# Patient Record
Sex: MALE | Race: WHITE | Employment: FULL TIME | URBAN - METROPOLITAN AREA
[De-identification: names, ages, dates, MRNs, and addresses within clinical notes are randomized per-mention and may not be internally consistent; named-entity substitution may affect disease eponyms.]

---

## 2018-07-16 ENCOUNTER — ED HISTORICAL/CONVERTED ENCOUNTER (OUTPATIENT)
Dept: OTHER | Age: 33
End: 2018-07-16

## 2021-06-04 ENCOUNTER — HOSPITAL ENCOUNTER (INPATIENT)
Age: 36
LOS: 7 days | Discharge: HOME | DRG: 754 | End: 2021-06-11
Attending: PSYCHIATRY & NEUROLOGY | Admitting: PSYCHIATRY & NEUROLOGY
Payer: COMMERCIAL

## 2021-06-04 DIAGNOSIS — T78.40XA ALLERGIC REACTION, INITIAL ENCOUNTER: ICD-10-CM

## 2021-06-04 DIAGNOSIS — F32.A DEPRESSION, UNSPECIFIED DEPRESSION TYPE: ICD-10-CM

## 2021-06-04 DIAGNOSIS — R45.851 SUICIDAL IDEATION: Primary | ICD-10-CM
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
## 2021-06-04 LAB
ALBUMIN SERPL-MCNC: 3.9 G/DL (ref 3.5–5)
ALBUMIN/GLOB SERPL: 1 {RATIO} (ref 1.1–2.2)
ALP SERPL-CCNC: 75 U/L (ref 45–117)
ALT SERPL-CCNC: 36 U/L (ref 12–78)
AMPHET UR QL SCN: NEGATIVE
ANION GAP SERPL CALC-SCNC: 5 MMOL/L (ref 5–15)
APAP SERPL-MCNC: <10 UG/ML (ref 10–30)
APPEARANCE UR: CLEAR
AST SERPL W P-5'-P-CCNC: 16 U/L (ref 15–37)
BACTERIA URNS QL MICRO: NEGATIVE /HPF
BARBITURATES UR QL SCN: NEGATIVE
BASOPHILS # BLD: 0 K/UL (ref 0–0.1)
BASOPHILS NFR BLD: 1 % (ref 0–1)
BENZODIAZ UR QL: NEGATIVE
BILIRUB SERPL-MCNC: 0.5 MG/DL (ref 0.2–1)
BILIRUB UR QL: NEGATIVE
BUN SERPL-MCNC: 19 MG/DL (ref 6–20)
BUN/CREAT SERPL: 17 (ref 12–20)
CA-I BLD-MCNC: 9.2 MG/DL (ref 8.5–10.1)
CANNABINOIDS UR QL SCN: NEGATIVE
CHLORIDE SERPL-SCNC: 107 MMOL/L (ref 97–108)
CO2 SERPL-SCNC: 29 MMOL/L (ref 21–32)
COCAINE UR QL SCN: NEGATIVE
COLOR UR: NORMAL
CREAT SERPL-MCNC: 1.12 MG/DL (ref 0.7–1.3)
DATE LAST DOSE: ABNORMAL
DATE LAST DOSE: ABNORMAL
DIFFERENTIAL METHOD BLD: NORMAL
DRUG SCRN COMMENT,DRGCM: NORMAL
EOSINOPHIL # BLD: 0.3 K/UL (ref 0–0.4)
EOSINOPHIL NFR BLD: 5 % (ref 0–7)
ERYTHROCYTE [DISTWIDTH] IN BLOOD BY AUTOMATED COUNT: 13.1 % (ref 11.5–14.5)
ETHANOL SERPL-MCNC: <4 MG/DL
GLOBULIN SER CALC-MCNC: 3.8 G/DL (ref 2–4)
GLUCOSE SERPL-MCNC: 101 MG/DL (ref 65–100)
GLUCOSE UR STRIP.AUTO-MCNC: NEGATIVE MG/DL
HCT VFR BLD AUTO: 45 % (ref 36.6–50.3)
HGB BLD-MCNC: 15 G/DL (ref 12.1–17)
HGB UR QL STRIP: NEGATIVE
IMM GRANULOCYTES # BLD AUTO: 0 K/UL (ref 0–0.04)
IMM GRANULOCYTES NFR BLD AUTO: 0 % (ref 0–0.5)
KETONES UR QL STRIP.AUTO: NEGATIVE MG/DL
LEUKOCYTE ESTERASE UR QL STRIP.AUTO: NEGATIVE
LYMPHOCYTES # BLD: 1.6 K/UL (ref 0.8–3.5)
LYMPHOCYTES NFR BLD: 27 % (ref 12–49)
MCH RBC QN AUTO: 31.3 PG (ref 26–34)
MCHC RBC AUTO-ENTMCNC: 33.3 G/DL (ref 30–36.5)
MCV RBC AUTO: 93.9 FL (ref 80–99)
METHADONE UR QL: NEGATIVE
MONOCYTES # BLD: 0.6 K/UL (ref 0–1)
MONOCYTES NFR BLD: 10 % (ref 5–13)
NEUTS SEG # BLD: 3.4 K/UL (ref 1.8–8)
NEUTS SEG NFR BLD: 57 % (ref 32–75)
NITRITE UR QL STRIP.AUTO: NEGATIVE
NRBC # BLD: 0 K/UL (ref 0–0.01)
NRBC BLD-RTO: 0 PER 100 WBC
OPIATES UR QL: NEGATIVE
PCP UR QL: NEGATIVE
PH UR STRIP: 6 [PH] (ref 5–8)
PLATELET # BLD AUTO: 187 K/UL (ref 150–400)
PMV BLD AUTO: 11.8 FL (ref 8.9–12.9)
POTASSIUM SERPL-SCNC: 4.1 MMOL/L (ref 3.5–5.1)
PROT SERPL-MCNC: 7.7 G/DL (ref 6.4–8.2)
PROT UR STRIP-MCNC: NEGATIVE MG/DL
RBC # BLD AUTO: 4.79 M/UL (ref 4.1–5.7)
RBC #/AREA URNS HPF: NORMAL /HPF (ref 0–5)
REPORTED DOSE,DOSE: ABNORMAL UNITS
REPORTED DOSE,DOSE: ABNORMAL UNITS
SALICYLATES SERPL-MCNC: <1.7 MG/DL (ref 2.8–20)
SARS-COV-2, COV2: NORMAL
SARS-COV-2, COV2: NOT DETECTED
SODIUM SERPL-SCNC: 141 MMOL/L (ref 136–145)
SP GR UR REFRACTOMETRY: 1.01 (ref 1–1.03)
UROBILINOGEN UR QL STRIP.AUTO: 0.1 EU/DL (ref 0.1–1)
WBC # BLD AUTO: 6 K/UL (ref 4.1–11.1)
WBC URNS QL MICRO: NORMAL /HPF (ref 0–4)

## 2021-06-04 PROCEDURE — 74011250637 HC RX REV CODE- 250/637: Performed by: NURSE PRACTITIONER

## 2021-06-04 PROCEDURE — 80143 DRUG ASSAY ACETAMINOPHEN: CPT

## 2021-06-04 PROCEDURE — 81001 URINALYSIS AUTO W/SCOPE: CPT

## 2021-06-04 PROCEDURE — 36415 COLL VENOUS BLD VENIPUNCTURE: CPT

## 2021-06-04 PROCEDURE — 99283 EMERGENCY DEPT VISIT LOW MDM: CPT

## 2021-06-04 PROCEDURE — 74011636637 HC RX REV CODE- 636/637: Performed by: NURSE PRACTITIONER

## 2021-06-04 PROCEDURE — 87635 SARS-COV-2 COVID-19 AMP PRB: CPT

## 2021-06-04 PROCEDURE — 85025 COMPLETE CBC W/AUTO DIFF WBC: CPT

## 2021-06-04 PROCEDURE — 65220000003 HC RM SEMIPRIVATE PSYCH

## 2021-06-04 PROCEDURE — 80179 DRUG ASSAY SALICYLATE: CPT

## 2021-06-04 PROCEDURE — 80307 DRUG TEST PRSMV CHEM ANLYZR: CPT

## 2021-06-04 PROCEDURE — 80053 COMPREHEN METABOLIC PANEL: CPT

## 2021-06-04 PROCEDURE — 74011250637 HC RX REV CODE- 250/637: Performed by: PSYCHIATRY & NEUROLOGY

## 2021-06-04 PROCEDURE — 82077 ASSAY SPEC XCP UR&BREATH IA: CPT

## 2021-06-04 RX ORDER — LORATADINE 10 MG/1
10 TABLET ORAL ONCE
Status: COMPLETED | OUTPATIENT
Start: 2021-06-04 | End: 2021-06-04

## 2021-06-04 RX ORDER — TRAZODONE HYDROCHLORIDE 50 MG/1
50 TABLET ORAL
Status: DISCONTINUED | OUTPATIENT
Start: 2021-06-04 | End: 2021-06-11 | Stop reason: HOSPADM

## 2021-06-04 RX ORDER — MAG HYDROX/ALUMINUM HYD/SIMETH 200-200-20
30 SUSPENSION, ORAL (FINAL DOSE FORM) ORAL
Status: DISCONTINUED | OUTPATIENT
Start: 2021-06-04 | End: 2021-06-11 | Stop reason: HOSPADM

## 2021-06-04 RX ORDER — LANOLIN ALCOHOL/MO/W.PET/CERES
3 CREAM (GRAM) TOPICAL
Status: DISCONTINUED | OUTPATIENT
Start: 2021-06-04 | End: 2021-06-11 | Stop reason: HOSPADM

## 2021-06-04 RX ORDER — ACETAMINOPHEN 325 MG/1
650 TABLET ORAL
Status: DISCONTINUED | OUTPATIENT
Start: 2021-06-04 | End: 2021-06-11 | Stop reason: HOSPADM

## 2021-06-04 RX ORDER — PREDNISONE 20 MG/1
60 TABLET ORAL
Status: COMPLETED | OUTPATIENT
Start: 2021-06-04 | End: 2021-06-04

## 2021-06-04 RX ORDER — HYDROXYZINE 50 MG/1
50 TABLET, FILM COATED ORAL
Status: DISCONTINUED | OUTPATIENT
Start: 2021-06-04 | End: 2021-06-11 | Stop reason: HOSPADM

## 2021-06-04 RX ADMIN — MELATONIN TAB 3 MG 3 MG: 3 TAB at 22:33

## 2021-06-04 RX ADMIN — PREDNISONE 60 MG: 20 TABLET ORAL at 12:39

## 2021-06-04 RX ADMIN — ACETAMINOPHEN 650 MG: 325 TABLET ORAL at 22:33

## 2021-06-04 RX ADMIN — LORATADINE 10 MG: 10 TABLET ORAL at 12:39

## 2021-06-04 NOTE — ED TRIAGE NOTES
Pt with redness and swelling to the top of his face that began 2 days ago. Denies any injury or dental pain.
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
## 2021-06-04 NOTE — ED PROVIDER NOTES
EMERGENCY DEPARTMENT HISTORY AND PHYSICAL EXAM      Date: 6/4/2021  Patient Name: Alex Rodriguez      History of Presenting Illness     Chief Complaint   Patient presents with    Skin Problem    Mental Health Problem       History Provided By: Patient    HPI: Alex Rodriguez, 38 y.o. male with a past medical history significant depression presents to the ED with cc of generalized swelling, redness to face with itching for the past 2 days. He denies any recent change in facial products, lotion, detergent. patient denies any fever, chills, shortness of breath, chest pain, nausea, vomiting, difficulty swallowing, change in voice. He also complains of suicidal ideations for the past several months. Reports aggravating symptoms include him and his wife fighting at home. Reports history of depression no current medications denies any PCP or psychiatrist at this time denies any actual plan to harm himself or homicidal ideations. There are no other complaints, changes, or physical findings at this time. PCP: None    Current Outpatient Medications   Medication Sig Dispense Refill    HYDROcodone-acetaminophen (NORCO) 5-325 mg per tablet Take 1 Tab by mouth every four (4) hours as needed for Pain. Max Daily Amount: 6 Tabs. 10 Tab 0       Past History     Past Medical History:  Past Medical History:   Diagnosis Date    Depression        Past Surgical History:  History reviewed. No pertinent surgical history. Family History:  History reviewed. No pertinent family history. Social History:  Social History     Tobacco Use    Smoking status: Current Every Day Smoker     Packs/day: 1.00   Substance Use Topics    Alcohol use: No    Drug use: Not on file       Allergies: Allergies   Allergen Reactions    Remeron [Mirtazapine] Other (comments)         Review of Systems     Review of Systems   Constitutional: Negative for chills and fever. HENT: Negative for congestion, sinus pressure and sinus pain. Respiratory: Negative for cough and shortness of breath. Cardiovascular: Negative for chest pain and leg swelling. Gastrointestinal: Negative for abdominal pain, nausea and vomiting. Genitourinary: Negative for dysuria, frequency and urgency. Musculoskeletal: Negative for arthralgias and myalgias. Skin: Positive for rash. Neurological: Negative for dizziness, weakness, light-headedness, numbness and headaches. Psychiatric/Behavioral: Positive for suicidal ideas. Physical Exam     Physical Exam  Vitals and nursing note reviewed. Constitutional:       General: He is not in acute distress. Appearance: Normal appearance. He is normal weight. He is not ill-appearing or toxic-appearing. HENT:      Head: Normocephalic and atraumatic. Right Ear: Hearing normal.      Left Ear: Hearing normal.      Nose: Nose normal.      Mouth/Throat:      Mouth: Mucous membranes are moist.   Eyes:      General: Lids are normal.      Extraocular Movements: Extraocular movements intact. Conjunctiva/sclera:      Right eye: Right conjunctiva is not injected. Left eye: Left conjunctiva is not injected. Pupils: Pupils are equal, round, and reactive to light. Cardiovascular:      Rate and Rhythm: Normal rate and regular rhythm. Pulses: Normal pulses. Radial pulses are 2+ on the right side and 2+ on the left side. Dorsalis pedis pulses are 2+ on the right side and 2+ on the left side. Pulmonary:      Effort: Pulmonary effort is normal. No accessory muscle usage or respiratory distress. Breath sounds: Normal breath sounds. No wheezing or rhonchi. Abdominal:      General: Bowel sounds are normal.      Palpations: Abdomen is soft. Tenderness: There is no abdominal tenderness. There is no right CVA tenderness or left CVA tenderness. Musculoskeletal:      Cervical back: Normal range of motion and neck supple. No muscular tenderness. Right lower leg: No edema. Left lower leg: No edema. Feet:      Right foot:      Skin integrity: No skin breakdown. Left foot:      Skin integrity: No skin breakdown. Skin:     General: Skin is warm and dry. Capillary Refill: Capillary refill takes less than 2 seconds. Findings: Erythema present. No abrasion, bruising, ecchymosis or signs of injury. Comments: Facial erythema, mild edema noted bilaterally, see pic below   Neurological:      Mental Status: He is alert and oriented to person, place, and time. GCS: GCS eye subscore is 4. GCS verbal subscore is 5. GCS motor subscore is 6. Cranial Nerves: Cranial nerves are intact. Sensory: Sensation is intact. Psychiatric:         Attention and Perception: Attention normal.         Mood and Affect: Mood normal.         Behavior: Behavior normal. Behavior is cooperative. Cognition and Memory: Cognition normal.             Lab and Diagnostic Study Results     Labs -     Recent Results (from the past 12 hour(s))   CBC WITH AUTOMATED DIFF    Collection Time: 06/04/21 12:50 PM   Result Value Ref Range    WBC 6.0 4.1 - 11.1 K/uL    RBC 4.79 4.10 - 5.70 M/uL    HGB 15.0 12.1 - 17.0 g/dL    HCT 45.0 36.6 - 50.3 %    MCV 93.9 80.0 - 99.0 FL    MCH 31.3 26.0 - 34.0 PG    MCHC 33.3 30.0 - 36.5 g/dL    RDW 13.1 11.5 - 14.5 %    PLATELET 770 728 - 579 K/uL    MPV 11.8 8.9 - 12.9 FL    NRBC 0.0 0.0  WBC    ABSOLUTE NRBC 0.00 0.00 - 0.01 K/uL    NEUTROPHILS 57 32 - 75 %    LYMPHOCYTES 27 12 - 49 %    MONOCYTES 10 5 - 13 %    EOSINOPHILS 5 0 - 7 %    BASOPHILS 1 0 - 1 %    IMMATURE GRANULOCYTES 0 0 - 0.5 %    ABS. NEUTROPHILS 3.4 1.8 - 8.0 K/UL    ABS. LYMPHOCYTES 1.6 0.8 - 3.5 K/UL    ABS. MONOCYTES 0.6 0.0 - 1.0 K/UL    ABS. EOSINOPHILS 0.3 0.0 - 0.4 K/UL    ABS. BASOPHILS 0.0 0.0 - 0.1 K/UL    ABS. IMM.  GRANS. 0.0 0.00 - 0.04 K/UL    DF AUTOMATED     METABOLIC PANEL, COMPREHENSIVE    Collection Time: 06/04/21 12:50 PM   Result Value Ref Range Sodium 141 136 - 145 mmol/L    Potassium 4.1 3.5 - 5.1 mmol/L    Chloride 107 97 - 108 mmol/L    CO2 29 21 - 32 mmol/L    Anion gap 5 5 - 15 mmol/L    Glucose 101 (H) 65 - 100 mg/dL    BUN 19 6 - 20 mg/dL    Creatinine 1.12 0.70 - 1.30 mg/dL    BUN/Creatinine ratio 17 12 - 20      GFR est AA >60 >60 ml/min/1.73m2    GFR est non-AA >60 >60 ml/min/1.73m2    Calcium 9.2 8.5 - 10.1 mg/dL    Bilirubin, total 0.5 0.2 - 1.0 mg/dL    AST (SGOT) 16 15 - 37 U/L    ALT (SGPT) 36 12 - 78 U/L    Alk.  phosphatase 75 45 - 117 U/L    Protein, total 7.7 6.4 - 8.2 g/dL    Albumin 3.9 3.5 - 5.0 g/dL    Globulin 3.8 2.0 - 4.0 g/dL    A-G Ratio 1.0 (L) 1.1 - 2.2     ETHYL ALCOHOL    Collection Time: 06/04/21 12:50 PM   Result Value Ref Range    ALCOHOL(ETHYL),SERUM <4 <50 mg/dL   SALICYLATE    Collection Time: 06/04/21 12:50 PM   Result Value Ref Range    Salicylate level <8.0 (L) 2.8 - 20.0 mg/dL    Reported dose date Blood      Reported dose: Blood Units   ACETAMINOPHEN    Collection Time: 06/04/21 12:50 PM   Result Value Ref Range    Acetaminophen level <10 (L) 10 - 30 ug/mL    Reported dose date Blood      Reported dose: Blood Units   SARS-COV-2    Collection Time: 06/04/21  3:04 PM   Result Value Ref Range    SARS-CoV-2 Please find results under separate order     SARS-COV-2    Collection Time: 06/04/21  3:04 PM   Result Value Ref Range    SARS-CoV-2 Not Detected Not Detected     DRUG SCREEN, URINE    Collection Time: 06/04/21  3:20 PM   Result Value Ref Range    AMPHETAMINES Negative Negative      BARBITURATES Negative Negative      BENZODIAZEPINES Negative Negative      COCAINE Negative Negative      METHADONE Negative Negative      OPIATES Negative Negative      PCP(PHENCYCLIDINE) Negative Negative      THC (TH-CANNABINOL) Negative Negative      Drug screen comment        This test is a screen for drugs of abuse in a medical setting only (i.e., they are unconfirmed results and as such must not be used for non-medical purposes, e.g.,employment testing, legal testing). Due to its inherent nature, false positive (FP) and false negative (FN) results may be obtained. Therefore, if necessary for medical care, recommend confirmation of positive findings by GC/MS. URINALYSIS W/MICROSCOPIC    Collection Time: 06/04/21  3:20 PM   Result Value Ref Range    Color Yellow/Straw      Appearance Clear Clear      Specific gravity 1.014 1.003 - 1.030      pH (UA) 6.0 5.0 - 8.0      Protein Negative Negative mg/dL    Glucose Negative Negative mg/dL    Ketone Negative Negative mg/dL    Bilirubin Negative Negative      Blood Negative Negative      Urobilinogen 0.1 0.1 - 1.0 EU/dL    Nitrites Negative Negative      Leukocyte Esterase Negative Negative      WBC 0-4 0 - 4 /hpf    RBC 0-5 0 - 5 /hpf    Bacteria Negative Negative /hpf       Radiologic Studies -   [unfilled]  CT Results  (Last 48 hours)    None        CXR Results  (Last 48 hours)    None          Medical Decision Making and ED Course   - I am the first and primary provider for this patient AND AM THE PRIMARY PROVIDER OF RECORD. - I reviewed the vital signs, available nursing notes, past medical history, past surgical history, family history and social history. - Initial assessment performed. The patients presenting problems have been discussed, and the staff are in agreement with the care plan formulated and outlined with them. I have encouraged them to ask questions as they arise throughout their visit. Vital Signs-Reviewed the patient's vital signs.     Patient Vitals for the past 12 hrs:   Temp Pulse Resp BP SpO2   06/04/21 1146 98.1 °F (36.7 °C) 78 16 134/87 98 %       The patient presents with BH prpblem with a differential diagnosis of suicidal ideations, homicidal ideations, depression, bipolar, schizophrenia    ED Course:              Provider Notes (Medical Decision Making):   Patient with redness, erythema, edema, itching noted to bilateral sides of face, will treat for allergic type reaction, patient no acute respiratory distress afebrile SPO2 98% on room air not tachycardic respiratory rate 16 lungs clear to auscultation no rales rhonchi or wheezing. Denies any difficulty swallowing or shortness of breath. Patient medically clearanced. Accepted by behavioral health due to suicidal ideations history of depression with no medications. Patient advised the plan of care verbalized understanding stable at this time          Consultations:       Consultations: [facility]        Procedures and Sharonda Medellin, NP        Disposition     Disposition: Admitted to [address] [place] [address] at [facility] the case was discussed with the admitting physician Dr. Segal per [facility] intake      DISCHARGE PLAN:  1. Current Discharge Medication List      CONTINUE these medications which have NOT CHANGED    Details   HYDROcodone-acetaminophen (NORCO) 5-325 mg per tablet Take 1 Tab by mouth every four (4) hours as needed for Pain. Max Daily Amount: 6 Tabs. Qty: 10 Tab, Refills: 0           2. Follow-up Information    None       3. Return to ED if worse   4. Current Discharge Medication List          Diagnosis     Clinical Impression:   1. Suicidal ideation    2. Depression, unspecified depression type    3. Allergic reaction, initial encounter        Attestations:    Tyler Monet NP    Please note that this dictation was completed with Transifex, the computer voice recognition software. Quite often unanticipated grammatical, syntax, homophones, and other interpretive errors are inadvertently transcribed by the computer software. Please disregard these errors. Please excuse any errors that have escaped final proofreading. Thank you.

## 2021-06-04 NOTE — Clinical Note
Status[de-identified] IP BEHAVIORAL MEDICINE [112]   Type of Bed: Behavioral Health Acute [27]   Cardiac Monitoring Required?: No   Inpatient Hospitalization Certified Necessary for the Following Reasons: 3. Patient receiving treatment that can only be provided in an inpatient setting (further clarification in H&P documentation)   Admitting Diagnosis: Suicidal ideation [V62.84. ICD-9-CM]   Admitting Diagnosis: Depression [223363]   Admitting Physician: Virgene Fleischer [[id number]]   Attending Physician: Virgene Fleischer [[id number]]   Estimated Length of Stay: 2 Midnights   Discharge Plan[de-identified] Home with Office Follow-up

## 2021-06-04 NOTE — ED NOTES
TRANSFER - OUT REPORT:    Verbal report given to Celestina(name) on [facility]  being transferred to (unit) for routine progression of care       Report consisted of patients Situation, Background, Assessment and   Recommendations(SBAR). Information from the following report(s) SBAR, ED Summary, [facility] and Recent Results was reviewed with the receiving nurse. Lines:       Opportunity for questions and clarification was provided.       Patient transported with:   [facility]

## 2021-06-05 PROCEDURE — 65220000003 HC RM SEMIPRIVATE PSYCH

## 2021-06-05 PROCEDURE — 74011250637 HC RX REV CODE- 250/637: Performed by: INTERNAL MEDICINE

## 2021-06-05 RX ORDER — DIPHENHYDRAMINE HCL 25 MG
25 CAPSULE ORAL
Status: DISCONTINUED | OUTPATIENT
Start: 2021-06-05 | End: 2021-06-11 | Stop reason: HOSPADM

## 2021-06-05 RX ADMIN — DIPHENHYDRAMINE HYDROCHLORIDE 25 MG: 25 CAPSULE ORAL at 21:13
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
## 2021-06-05 NOTE — GROUP NOTE
[facility] GROUP DOCUMENTATION INDIVIDUAL Group Therapy Note Date: 6/5/2021 Group Start Time: 1100 Group End Time: 8601 Group Topic: Process Group - Inpatient SRM 2 BH NON ACUTE Belenda Frank [facility] GROUP DOCUMENTATION GROUP Group Therapy Note Attendees: 6 Pt's were encouraged to participate in process therapy group to focus on expressing their thoughts and feelings. Pt's were encouraged to discuss their reason for this hospital admission, what changes they are making to be successful when they leave and coping skills that they have learned since they have been here. Pt's were encouraged to be courteous of others and provide support to each others. Attendance: Attended Patient's Goal:  Pt was encouraged to participate in group and express their feelings appropriately while supporting their peers. Interventions/techniques: Challenged, Informed, Validated, Promoted peer support, Provide feedback and Supported Follows Directions: Followed directions Interactions: Interacted appropriately Mental Status: Calm and Depressed Behavior/appearance: Attentive, Cooperative and Motivated Goals Achieved: Able to engage in interactions, Able to listen to others, Able to receive feedback, Able to self-disclose and Discussed coping Additional Notes:  Pt appropriately participate in session and despite being new to the unit was able to self-disclose. He admitted that he had some suicidal thinking due to issues with his wife and reported that he is \"at my domenic's end\" and \"I don't know what to do\". He was receptive towards encouragement and feedback from others and made a goal to regain his stability during this hospital stay while reflecting on his feelings and emotions.   
 
Jade [place], [address]

## 2021-06-05 NOTE — BH NOTES
Mainor Wiggins went over Tx plan with this writer and signed it. Dr. Kailey Palomo could not be reached to give verbal approval at this time.

## 2021-06-05 NOTE — PROGRESS NOTES
Problem: Suicide  Goal: *STG: Remains safe in hospital  Outcome: Progressing Towards Goal     Problem: Suicide  Goal: *STG/LTG: Complies with medication therapy  Outcome: Progressing Towards Goal     Patient has been safe so far this shift. Patient was medication  compliant.

## 2021-06-05 NOTE — BH NOTES
PSA PART II ADDITIONAL INFORMATION        Access To Fire Arms: No    Substance Use: No    Last Use: NA    Type of Substance: NA    Frequency of Use: NA    Request to See : NO    If yes, notified : NO    Release of Information Signed: YS    Release of Information Signed For:   Pt signed an DAMIAN for his wife: Hilary Smart [address] [place], [address]: [phone]
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
## 2021-06-05 NOTE — BH NOTES
Dx: Depression w/SI    Affect restricted, at the beginning of the shift; however, observed ambulating in the hallway, talking to peers, after lunch. Pt reported he is having relationship issues, with his wife. Pt initially would not get up, to the phone, when his wife called; however, accepted the call, around 1330. Pt currently not on any scheduled meds; no request for prn's. Consumed 100% of meals. Has not attended to hygiene, this shift; provided with toiletries. Encouraged to attend groups. Q 15 mins checks maintained, for safety. Attended/participated in groups.

## 2021-06-06 PROCEDURE — 74011250637 HC RX REV CODE- 250/637: Performed by: PSYCHIATRY & NEUROLOGY

## 2021-06-06 PROCEDURE — 65220000003 HC RM SEMIPRIVATE PSYCH

## 2021-06-06 RX ADMIN — TRAZODONE HYDROCHLORIDE 50 MG: 50 TABLET ORAL at 22:17

## 2021-06-06 NOTE — BH NOTES
Dx: Depression w/SI    Affect full. OOB more. More social. Talked freely about his career as a . Talking more, via phone, to his wife. Pt currently not on scheduled meds. Appetite good; consumed 100% of meals. Attended to hygiene and dressed appropriately. No complaints. Encouraged to attend groups, today. Denied having thoughts to harm himself/others. Refused to attend groups, although encouraged. Q 15 mins checks maintained, for safety.

## 2021-06-06 NOTE — PROGRESS NOTES
Problem: Suicide  Goal: *STG: Remains safe in hospital  Outcome: Progressing Towards Goal     Problem: Suicide  Goal: *STG: Attends activities and groups  Outcome: Progressing Towards Goal

## 2021-06-06 NOTE — GROUP NOTE
[facility] GROUP DOCUMENTATION INDIVIDUAL Group Therapy Note Date: 6/6/2021 Group Start Time: 1500 Group End Time: 8843 Group Topic: Process Group - Inpatient SRM 2  NON ACUTE Sharlene Schmidt [facility] GROUP DOCUMENTATION GROUP Group Therapy Note Attendees: 7 Pt's were encouraged to participate in process group to express their thoughts and feelings while encouraging each other. This group focused on Anger and Self Esteem. We began the group by talking about angry reactions and working on identifying the underlying feelings. Pt's were presented with the \"Iceberg Theory\" which represents Anger as the 10% of people that we see on the surface of the water but the 90% of what is under the surface is what causes the angry reactions. Pt's were encouraged to identify their own underlying feelings to their anger. Group ended with each group member identifying 2 things that they liked about themselves (characteristic, something that they are good at, etc). Then they were encouraged to pick out good attributes about their peers. They were challenged to practice positive self talk for the rest of the day as well as support and encourage each other. We decided to call today \"Self-Esteem Sunday\". Attendance: Attended Patient's Goal:  Pt will identify 2 positive things about themselves. Pt will identify underlying feelings that lead to angry reactions. Pt will process feelings while supporting peers. Interventions/techniques: Challenged, Informed, Validated, Promoted peer support, Provide feedback and Supported Follows Directions: Followed directions Interactions: Interacted appropriately Mental Status: Anxious and Depressed Behavior/appearance: Attentive, Cooperative and Motivated Goals Achieved: Able to engage in interactions, Able to listen to others, Able to give feedback to another, Able to reflect/comment on own behavior, Able to manage/cope with feelings, Able to receive feedback, Able to self-disclose, Discussed coping, Discussed self-esteem issues and Identified feelings Additional Notes:  Pt identified his positive attributes as \"hardworking and kind\". He identified his underlying feelings to angry responses as \"stress, betrayal, failure and frustration. Pt participated actively and opened up about his relationship issues and how they have been affecting his anger and his self esteem. He presented as very sad and anxious about the situation.   
 
Amaury Alberto, [address] [facility], [address] [place] [address]

## 2021-06-06 NOTE — BH NOTES
Nursing Note    Patient is alert and oriented x 4. He denies any SI/HI/AH/VH. Patient denies any anxiety or depression. Broad affect and calm mood. Patient seen watching TV and playing bingo with peers. Staff will continue to monitor patient for safety.

## 2021-06-07 PROCEDURE — 74011250637 HC RX REV CODE- 250/637: Performed by: PSYCHIATRY & NEUROLOGY

## 2021-06-07 PROCEDURE — 65220000003 HC RM SEMIPRIVATE PSYCH

## 2021-06-07 RX ORDER — FLUOXETINE 10 MG/1
10 CAPSULE ORAL DAILY
Status: DISCONTINUED | OUTPATIENT
Start: 2021-06-07 | End: 2021-06-08

## 2021-06-07 RX ADMIN — MELATONIN TAB 3 MG 3 MG: 3 TAB at 22:02

## 2021-06-07 RX ADMIN — FLUOXETINE 10 MG: 10 CAPSULE ORAL at 08:43

## 2021-06-07 NOTE — GROUP NOTE
[facility]  GROUP DOCUMENTATION INDIVIDUAL Group Therapy Note Date: 6/7/2021 Group Start Time: 7414 Group End Time: 0412 Group Topic: Nursing SRM 2  NON ACUTE Sergey Amato LPN 
 
[facility] GROUP DOCUMENTATION GROUP Group Therapy Note Attendees: 5/9 Attendance: Attended Patient's Goal:  ID benefits of medications Interventions/techniques: Informed Follows Directions: Followed directions Interactions: Interacted appropriately Mental Status: Calm Behavior/appearance: Attentive Goals Achieved: Able to engage in interactions Additional Notes:  Pt. states he was not on any  Medications. He was receptive to the information discussed Debbie Iyer LPN

## 2021-06-07 NOTE — PROGRESS NOTES
Problem: Suicide  Goal: *STG: Remains safe in hospital  Outcome: Progressing Towards Goal     Problem: Suicide  Goal: *STG:  Verbalizes alternative ways of dealing with maladaptive feelings/behaviors  Outcome: Progressing Towards Goal     Problem: Suicide  Goal: *STG/LTG: Complies with medication therapy  Outcome: Progressing Towards Goal     Problem: Suicide  Goal: *STG/LTG: No longer expresses self destructive or suicidal thoughts  Outcome: Progressing Towards Goal
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
## 2021-06-07 NOTE — BH NOTES
Nursing Note    Patient is alert and oriented x 4. He denies any SI/HI/AH/VH. Patient denies any anxiety or depression. Broad affect and calm mood. Patient seen watching TV in the dayroom with peers. He requested Trazodone 50 mg PO for insomnia. Staff will continue to monitor patient for safety.

## 2021-06-07 NOTE — GROUP NOTE
[facility] GROUP DOCUMENTATION INDIVIDUAL Group Therapy Note Date: 6/7/2021 Group Start Time: 1100 Group End Time: 8324 Group Topic: Process Group - Inpatient SRM 2 BEHA HLTH ACUTE Gino Lamb [facility] GROUP DOCUMENTATION GROUP Group Therapy Note Attendees: 5/10 Process: Pts were asked to share something they are proud of as a check in question. Pts were then walked through the goal activity where they listed goals they want to make progress towards, goals they have made some progress towards and glas that they have achieved. Pts were then asked to reflect on group Attendance: Attended Patient's Goal:  Pt said that he is proud of his career and being able to 'make something of self' Interventions/techniques: Validated, Promoted peer support, Provide feedback and Supported Follows Directions: Followed directions Interactions: Interacted appropriately Mental Status: Calm, Congruent and Flat Behavior/appearance: Attentive, Motivated and Neatly groomed Goals Achieved: Able to engage in interactions, Able to listen to others and Able to reflect/comment on own behavior Additional Notes:  Pt said that he is proud of breaking the cycle of addiction in his family. Pt also spoke about being one in twenty in his family that have completed college. 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 Pt is making progress towards goals by attending and participating in group [id number]

## 2021-06-07 NOTE — GROUP NOTE
[facility]  GROUP DOCUMENTATION INDIVIDUAL Group Therapy Note Date: 6/7/2021 Group Start Time: 7820 Group End Time: 6558 Group Topic: [employer] SRM 2 BEHA HLTH ACUTE Andreea RAYMOND  GROUP DOCUMENTATION GROUP Group Therapy Note Attendees:  
 
  
 
Attendance: Did not attend Patient's Goal: Interventions/techniques: Follows Directions:  
 
Interactions:  
 
Mental Status:  
 
Behavior/appearance:  
 
Goals Achieved: Additional Notes:  Patient did not attend group.  
 
Lisa Perdomo

## 2021-06-07 NOTE — BH NOTES
Collateral:    Writer spoke with pt's wife, Stevan Gomez, at [phone]. She shared that she had noticed pt being moodier than usual.  She reports that pt has had thoughts of SI in the past but has never sought help for them to her knowledge. She reports that she has noticed pt being espinosa. She reports that she has been in school and started a new job recently and she has not been as available for patient. She reports that this may be contributing to moodiness. Wife also shared how pt had  Recently cheated on her via text message. She reports that she feels that they fought when she found out and thinks that this is what pushed pt to come to the hospital.  She reports that she is concerned that when pt comes home, he will have to face this all again and it will be overwhelming for patient. Writer asked if wife would be open to discussing this in family session and wife was agreeable.     Family session set for Wednesday 6/9/2021 at 10AM

## 2021-06-07 NOTE — BH NOTES
Behavioral Health Treatment Team Note     Patient goal(s) for today: Pt reports goal for today is to go home as soon as possible  Treatment team focus/goals: Gather collateral, continue treatment    Progress note: Pt presents standing in hallway, pacing, pressured speech, irritable mood, but polite abnd engaged. Pt denying any SI/HI/AVH. Pt reports that he had hiss TDO hearing and was upset that he had to stay up to 10 days. Pt reports that he has a lot going on and he needs to be home to support his family. Pt reports that he had a \"mental breakdown\" and now feels fine. Pt reports that he does not take medications and even the doctor said he seemed fine. Pt reports that he needs to go home so he can make money for his family. Writer explained how pt has to stay for up to 10 days but the doctor could let him go prior to that.     LOS:  3  Expected LOS: 5-7 days    Insurance info/prescription coverage:  MEDICAID PENDING - [facility] MEDICAID PENDING  Date of last family contact:  None made yet  Family requesting physician contact today:  no  Discharge plan:  Pt to discharge home with outpatient severo Medina in the home:  no   Outpatient provider(s):  None at this time    Participating treatment team members: Chantell Hernandez, * (assigned SW), Dona Moreno LMSW

## 2021-06-07 NOTE — GROUP NOTE
[facility] GROUP DOCUMENTATION INDIVIDUAL Group Therapy Note Date: 6/7/2021 Group Start Time: 1300 Group End Time: 1400 Group Topic: Psychological Group SRM BEHAVIORAL HLTH OP Heather SMART 
 
[facility] GROUP DOCUMENTATION GROUP Group Therapy Note Attendees: 7/9 Patients completed a safety plan that includes information on warning signs, coping skills, distractions, and people to call for help. Patients encouraged to discuss openly about their safety plan, gain ideas and support from their peers. Patients also used time to discuss CBT and different ways to manage intrusive negative thoughts. Attendance: Attended late Patient's Goal:  Attends activities and groups Interventions/techniques: Informed, Validated, Promoted peer support, Reinforced and Supported Follows Directions: Followed directions Interactions: Interacted appropriately Mental Status: Blunted, Calm and Flat Behavior/appearance: Attentive, Negative and Withdrawn/quiet Goals Achieved: Able to engage in interactions, Able to listen to others, Able to self-disclose, Discussed coping and Discussed safety plan Additional Notes:  Pt attended group and was engaged. Pt was able to complete safety plan. Pt reports that he feels that he has been unjustly hospitalized. Pastor Adan

## 2021-06-07 NOTE — BH NOTES
Patient visible on unit. Alert & oriented. Pleasant, calm, cooperative. Admitted under a TDO, and had his hearing, today; received, up to 10 days by Bryce Leventhal. Patient stated he does not need to be here; states he just had a \"mental breakdown\" with his wife. States he is supposed to be starting a new job, with a [address] [place] [address] Street, out of [facility]. Patient received first dose of fluoxetine; received medication education and verbalized understanding. Appearance is neat & clean. He consumes 100% of meals. Interacts appropriately with staff & peers. Q15 minute checks maintained, for safety.

## 2021-06-08 PROCEDURE — 74011250637 HC RX REV CODE- 250/637: Performed by: PSYCHIATRY & NEUROLOGY

## 2021-06-08 PROCEDURE — 65220000003 HC RM SEMIPRIVATE PSYCH

## 2021-06-08 RX ORDER — FLUOXETINE HYDROCHLORIDE 20 MG/1
20 CAPSULE ORAL DAILY
Status: DISCONTINUED | OUTPATIENT
Start: 2021-06-09 | End: 2021-06-11 | Stop reason: HOSPADM

## 2021-06-08 RX ADMIN — FLUOXETINE 10 MG: 10 CAPSULE ORAL at 09:23

## 2021-06-08 RX ADMIN — MELATONIN TAB 3 MG 3 MG: 3 TAB at 20:52

## 2021-06-08 NOTE — GROUP NOTE
[facility]  GROUP DOCUMENTATION INDIVIDUAL Group Therapy Note Date: 6/7/2021 Group Start Time: 2000 Group End Time: 2045 Group Topic: Recreational/Music Therapy SRM 2 BH NON ACUTE Lamount Villanueva [facility] GROUP DOCUMENTATION GROUP Group Therapy Note Attendees: 7/9 Introduce leisure task as positive way to cope and manage mood Attendance: Attended Patient's GoalSTG: Attends activities and groups Interventions/techniques: Art integration, Provide feedback and Supported Follows Directions: Followed directions Interactions: Interacted appropriately Mental Status: Calm and Flat Behavior/appearance: Attentive and Cooperative Goals Achieved: Able to engage in interactions and Able to listen to others Additional Notes: Attended group and actively participated. Received leisure packet and worked on task through out group. Selected songs to listen to in group. Interacted with staff. Verbalized enjoyment. Used leisure time constructively.   
 
John Victor, CTRS

## 2021-06-08 NOTE — BH NOTES
Patient case discussed in the treatment team event that led to the hospitalization and the patient says he and his wife was having an argument about him going on a long distance  trips she will want him to go home been surprising wife is in the process of going for a bypass gastric bypass surgery to lose weight he he denied further suicidal thoughts or hallucinations delusions paranoia no prior psychiatric history temperature 97.4 pulse 65 blood pressure 117/66 respiration 18 labs reviewed no new lab results resulted continued inpatient level of care indicated he remains superficial

## 2021-06-08 NOTE — GROUP NOTE
[facility] GROUP DOCUMENTATION INDIVIDUAL Group Therapy Note Date: 6/8/2021 Group Start Time: 3748 Group End Time: 4503 Group Topic: Process Group - Inpatient SRM 2 BEHA HLTH ACUTE Oj Bhat [facility] GROUP DOCUMENTATION GROUP Group Therapy Note Attendees: 5/9 Process: Pts were asked to share what they were grateful for as a check in question. Pts were then encouraged to share dates that were meaningful to them and writer read quotes from a book associated from that day. Pts were encouraged to reflect on what the quote meant to them and discussed boundaries, focusing on future and healing. Pts were then encouraged to reflect on group Attendance: Attended Patient's Goal:  Pt said he is grateful for his wife and children as a check in question Interventions/techniques: Validated, Promoted peer support, Provide feedback and Supported Follows Directions: Followed directions Interactions: Interacted appropriately Mental Status: Calm, Congruent and Flat Behavior/appearance: Attentive, Motivated and Neatly groomed Goals Achieved: Able to engage in interactions, Able to listen to others, Able to reflect/comment on own behavior, Able to manage/cope with feelings and Able to self-disclose Additional Notes:  Pt spoke about how his aunt struggles with heroin addiction and always messages him for money. Pt said he had to set a boundary with her so that he would not enable her behaviour. Pt also spoke about how when his mum moved away, she 'left him out of the loop' in regards to family events and pt reported feeling like 'a black sheep'. Pt is making progress towards goals by attending and participating in group [facility]

## 2021-06-08 NOTE — PROGRESS NOTES
Problem: Suicide  Goal: *STG: Remains safe in hospital  Outcome: Progressing Towards Goal     Problem: Suicide  Goal: *STG: Attends activities and groups  Outcome: Progressing Towards Goal     Problem: Suicide  Goal: *STG:  Verbalizes alternative ways of dealing with maladaptive feelings/behaviors  Outcome: Progressing Towards Goal     Problem: Suicide  Goal: *STG/LTG: Complies with medication therapy  Outcome: Progressing Towards Goal     Problem: Suicide  Goal: *STG/LTG: No longer expresses self destructive or suicidal thoughts  Outcome: Progressing Towards Goal
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
## 2021-06-08 NOTE — BH NOTES
[facility] Recreational Therapy Assessment    Orientation:  Person, Place, Date and Situation    Reason for Admission: Pt reports he was admitted because \" I initially came due to having an allergic reaction and face was swollen but then reported that me and wife were fighting due to infidelity, not working, and having increased stressors at home and verbalized some suicidal thoughts. Reports he does not have any current SI and has no intent to harm self. .   Medical Precautions / Conditions: Other- Allergic to Remeron, and has had some allergic reactions to something (unknown). Impairments:     Vision:  Wears Glasses No      Wears Contacts No      Are they with Patient N/A     Hearing Aids No     Utilization of Ambulatory Devices:  None    Health Problems Preventing Participation in Activities:  No   How:  n/a    Leisure Interest Checklist:  Art, Bowling, Cards / Board Games, [employer] [facility], Crafts, Dancing, Exercising, Facebook, Fishing, [address] [facility], [address], Listening to Music, [employer], Playing an [employer], Reading, Singing, Sports, TV / Lauren Patrickwski, Visiting with Others, Texting, Video Games, Volunteer Work and Walking    Does patient participate in leisure activities:  Yes    Setting:  With Family    Other Activities / Skills / Prudy Low:  n/a  Do emotions interfere with leisure activities / lifestyles:  No    When engaging in leisure activities, do you forget worries:  Yes    Do you belong to a Voodoo:  No    Are you active in [place] activities:  No    Typical Day: PT reports 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      Strengths:  Verbal Expression, Motivation and Housing    Limitations / Barriers:  relationship with wife, and has anger issues that he works to control     Treatment Modalities:  Stress Management, Coping Skills, Symptom Recognition, Healthy Thinking, Mood Management and Leisure Skills    Patient Educational  Needs:  Skills to recognize and challenge problematic thinking, Identify positive coping strategies and skills to manage symptoms or moods, Leisure education and Recognition of symptoms and signs    Focus of Treatment:  Introduce positive outlets for self expression and Introduce and encourage the exploration of alternative coping skills    Summary:  PT lives in [place] with wife and children. 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 PT reports when he is able to work, he is able to do well, it is when he loses control of those things that things fall apart. Pt reports not seeing any mental health providers. Pt reports previous hospitalization at [facility] about 10 years ago PT reports goal is to \"get back on my normal self and be able to cope with stressors\".

## 2021-06-08 NOTE — GROUP NOTE
IP  GROUP DOCUMENTATION INDIVIDUAL Group Therapy Note Date: 6/8/2021 Group Start Time: 3770 Group End Time: 1400 Group Topic: Education Group - Inpatient [facility] 2  NON ACUTE Sandra Sanchez 
 
[facility] GROUP DOCUMENTATION GROUP Group Therapy Note Facilitated discussion focused on understanding and developing healthy boundaries to improve relationships Attendees: 6/9 Attendance: Attended Patient's Goal:  *STG: Attends activities and groups Interventions/techniques: Informed and Supported Follows Directions: Followed directions Interactions: Interacted appropriately Mental Status: Calm Behavior/appearance: Cooperative Goals Achieved: Able to engage in interactions and Able to listen to others Additional Notes:  Pt was receptive to information discussed on healthy and unhealthy boundaries. Pt shared a healthy boundary that applied to him such as \"have limits and recognize what they are\" and a unhealthy boundary such as \"trust untrustworthy people and overly tolerant of inappropriate behaviors\"  Pt shared Mankim Maynard has to establish limits with those closest to him such as \"limit the time with people that hurt him the most\" Tonio Judd, CTRS

## 2021-06-08 NOTE — BH NOTES
Patient visible on unit. Alert & oriented. Affect, full. Patient accepts medications as scheduled, stating \"I don't need this, I am not depressed but I'll take it\". He attends group sessions and interacts appropriately with staff & peers. He eats 100% of his meals and reports sleeping well. His appearance is neat. He received several calls from wife. He denies any thoughts of lethality towards self or others. Encouraged to seek support from staff as needed. Will continue to monitor on close observation to ensure patient safety and follow treatment plan as written.

## 2021-06-08 NOTE — PROGRESS NOTES
Problem: Suicide  Goal: *STG: Remains safe in hospital  Outcome: Progressing Towards Goal  Goal: *STG: Seeks staff when feelings of self harm or harm towards others arise  Outcome: Progressing Towards Goal  Goal: *STG: Attends activities and groups  Outcome: Progressing Towards Goal  Goal: *STG/LTG: Complies with medication therapy  Outcome: Progressing Towards Goal     Problem: Depressed Mood (Adult/Pediatric)  Goal: *STG: Remains safe in hospital  Outcome: Progressing Towards Goal  Goal: *STG: Complies with medication therapy  Outcome: Progressing Towards Goal  Goal: *LTG: Returns to previous level of functioning and participates with after care plan  Outcome: Progressing Towards Goal     Problem: Falls - Risk of  Goal: *Absence of Falls  Description: Document Hugo Fall Risk and appropriate interventions in the flowsheet.   Outcome: Progressing Towards Goal  Note: Fall Risk Interventions:

## 2021-06-08 NOTE — GROUP NOTE
[facility]  GROUP DOCUMENTATION INDIVIDUAL Group Therapy Note Date: 6/8/2021 Group Start Time: 3402 Group End Time: 6216 Group Topic: [employer] SRM 2 BEHA HLTH ACUTE Nicola Lazarer 
 
[facility]  GROUP DOCUMENTATION GROUP Group Therapy Note Attendees: 
 
  
 
Attendance: Attended Patient's Goal  Patient stated mood is tired, goal is to do not fight with anyone. Interventions/techniques: Supported Follows Directions: Followed directions Interactions: Interacted appropriately Mental Status: Calm Behavior/appearance: Attentive Goals Achieved: Able to engage in interactions Additional Notes Patient participated in group activity.  
 
Smooth Quiñones

## 2021-06-08 NOTE — BH NOTES
Behavioral Health Treatment Team Note     Patient goal(s) for today: Pt reports goal today is to go home as soon as possible. Treatment team focus/goals: Continue to provide treatment    Progress note: Pt presents seated in bed, well groomed, pressured speech, irritable affect, angry mood, deflecting thought process, no insight. Pt denying any SI/HI/AVH. Pt reports that he is angry that he is still here. Pt reports that he is having a hard time and reports that he is annoyed with the process. Pt reports that he had a mental breakdown and he feels fine now, does not need to be here. Pt has no insight into how he was feeling prior to getting to the hospital.  Pt reports that he needs to be home so he can help his wife who has surgery. Pt reports that he is angry about being here. Writer explained that pt is under TDO, will be staying up to 10 days. Pt reported understanding.     LOS:  4  Expected LOS: 5-7 days    Insurance info/prescription coverage:  [facility]  Date of last family contact:  5/7/2021  Family requesting physician contact today:  no  Discharge plan:  Pt to discharge home with outpatient Cecille Ricketts in the home:  no   Outpatient provider(s):  None at this time     Participating treatment team members: Chantell Hernandez, * (assigned SW), Hellen Dixon LMSW

## 2021-06-09 PROCEDURE — 65220000003 HC RM SEMIPRIVATE PSYCH

## 2021-06-09 PROCEDURE — 74011250637 HC RX REV CODE- 250/637: Performed by: PSYCHIATRY & NEUROLOGY

## 2021-06-09 RX ADMIN — FLUOXETINE 20 MG: 20 CAPSULE ORAL at 08:54

## 2021-06-09 RX ADMIN — TRAZODONE HYDROCHLORIDE 50 MG: 50 TABLET ORAL at 21:21
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
## 2021-06-09 NOTE — BH NOTES
Patient has been  Up on the unit walking @ intervals. He has a family session this morning. He has denied any depression, anxiety, SI or HI. He has been medication compliant. He has attended  some groups. He has remains on close observation.

## 2021-06-09 NOTE — GROUP NOTE
IP BH GROUP DOCUMENTATION INDIVIDUAL Group Therapy Note Date: 6/9/2021 Group Start Time: 1000 Group End Time: 9730 Group Topic: [employer] SRM 2 BH NON ACUTE Thor Donley IP BH GROUP DOCUMENTATION GROUP Group Therapy Note Attendees:4 Attendance: Did not attend Patient's Goal: Interventions/techniques: Follows Directions:  
 
Interactions:  
 
Mental Status:  
 
Behavior/appearance:  
 
Goals Achieved: Additional Notes:  Pt was encouraged to attend group but refused. [facility]

## 2021-06-09 NOTE — GROUP NOTE
IP  GROUP DOCUMENTATION INDIVIDUAL Group Therapy Note Date: 6/8/2021 Group Start Time: 200 Group End Time: 2000 Group Topic: Recreational/Music Therapy SRM 2  NON ACUTE Taylor Dodson [facility] GROUP DOCUMENTATION GROUP Group Therapy Note Attendees:10/18 Introduced health leisure task skill as positive way to cope and manage mood. Attendance: Attended Patient's Goal:  STG: Attends activities and groups Interventions/techniques: Art integration and Supported Follows Directions: Followed directions Interactions: Interacted appropriately Mental Status: Calm and Flat Behavior/appearance: Attentive and Cooperative Goals Achieved: Able to listen to others Additional Notes: Pt joined group late. Came into group for a short time. Selected a song and listened to a few songs and then left group again.   
 
Kwaku Whitaker, CTRS

## 2021-06-09 NOTE — PROGRESS NOTES
Problem: Suicide  Goal: *STG: Remains safe in hospital  Outcome: Progressing Towards Goal  Goal: *STG: Seeks staff when feelings of self harm or harm towards others arise  Outcome: Progressing Towards Goal  Goal: *STG: Attends activities and groups  Outcome: Progressing Towards Goal  Goal: *STG/LTG: Complies with medication therapy  Outcome: Progressing Towards Goal  Goal: *STG/LTG: No longer expresses self destructive or suicidal thoughts  Outcome: Progressing Towards Goal
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
## 2021-06-09 NOTE — GROUP NOTE
IP  GROUP DOCUMENTATION INDIVIDUAL Group Therapy Note Date: 6/9/2021 Group Start Time: 9727 Group End Time: 1200 Group Topic: Education Group - Inpatient SRM 2 BH NON ACUTE [place] 
 
IP  GROUP DOCUMENTATION GROUP Group Therapy Note Introduced information focused on the definition and symptoms of trauma, treating trauma and common reactions to trauma Attendees: 6/10 Attendance: Attended Patient's Goal:  *STG: Attends activities and groups Interventions/techniques: Informed and Supported Follows Directions: Followed directions Interactions: Interacted appropriately Mental Status: Calm Behavior/appearance: Cooperative Goals Achieved: Able to engage in interactions and Able to listen to others Additional Notes:  Pt was receptive to information and acknowledged he has experience trauma and was able to talk about it.  
 
Chano Brewster, SHIVAS
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
## 2021-06-09 NOTE — BH NOTES
Patient case discussed in the treatment team patient eager to go home poor insight however does state that he and his wife talked wife needs gastric bypass surgery and they try to work on the relationship denies suicidal thoughts he weakly talked about just having a nervous breakdown does not talk about wanting to jump off a bridge.   He is taking his medication and attending some groups keeping with a personal hygiene grooming nutrition energy motivation fair he would like to be discharged today or before his wife says that his vital signs today temperature 97.6 pulse 63 blood pressure 118/74 respiration 18 SPO2 97 on room air no new labs resulted within last 24 hours continued inpatient level of care indicated

## 2021-06-10 PROCEDURE — 74011250637 HC RX REV CODE- 250/637: Performed by: PSYCHIATRY & NEUROLOGY

## 2021-06-10 PROCEDURE — 65220000003 HC RM SEMIPRIVATE PSYCH

## 2021-06-10 RX ADMIN — MELATONIN TAB 3 MG 3 MG: 3 TAB at 21:24

## 2021-06-10 RX ADMIN — FLUOXETINE 20 MG: 20 CAPSULE ORAL at 09:07

## 2021-06-10 RX ADMIN — TRAZODONE HYDROCHLORIDE 50 MG: 50 TABLET ORAL at 21:24

## 2021-06-10 NOTE — BH NOTES
Nursing Note    Patient is alert and oriented x 4. He denies any anxiety or depression. Patient denies any SI/HI/AH/VH. Broad affect and calm mood. Patient seen watching TV in the dayroom. Staff will continue to monitor patient for safety.

## 2021-06-10 NOTE — PROGRESS NOTES
Problem: Suicide  Goal: *STG: Remains safe in hospital  Outcome: Progressing Towards Goal     Problem: Suicide  Goal: *STG/LTG: No longer expresses self destructive or suicidal thoughts  Outcome: Progressing Towards Goal
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
## 2021-06-10 NOTE — BH NOTES
Patient up to the dayroom for meals, ate 100%. Medication compliant. No side effects noted. Stated that his wife is having surgery next week. Stated that he hopes to go home soon. Attends groups. Denies SI/HI. Denies AVH. No physical complaints voiced. Remains on CO. Continue to monitor.

## 2021-06-10 NOTE — BH NOTES
Behavioral Health Treatment Team Note     Patient goal(s) for today: Pt reports goal for today is to get through the day. Treatment team focus/goals: Continue treatment    Progress note: Pt presents seated in day room, bright affect, \"good\" mood, clear speech, polite and engaged with writer. Pt denies any SI/HI/AVH. Pt reports that he is feeling well today. Pt reports that he feels much better today. Pt reports that he slept well last night and was able to eat his breakfast this morning. Pt reports that he is hopeful that he can go home soon. Writer reported that the doctor was thinking about a Friday discharge. Pt reports he is fine with this. Inpatient level of care indicated du to need for further medication management, further therapeutic intervention, further case management services.     LOS:  6  Expected LOS: 5-7 days    Insurance info/prescription coverage:  MEDICAID PENDING - [facility] MEDICAID PENDING  Date of last family contact:  5/7/2021  Family requesting physician contact today:  no  Discharge plan:  Pt to discharge home with outpatient severo Medina in the home:  no   Outpatient provider(s):  None at this time     Participating treatment team members: Doug Batres, * (assigned SW), Rosaura Wallace, BLANCA

## 2021-06-10 NOTE — GROUP NOTE
IP  GROUP DOCUMENTATION INDIVIDUAL Group Therapy Note Date: 6/10/2021 Group Start Time: 3824 Group End Time: 1400 Group Topic: Education Group - Inpatient SRM 2 BH NON ACUTE [place] [place] 
 
IP  GROUP DOCUMENTATION GROUP Group Therapy Note Facilitated discussion focused on the definition and benefits of mindfulness meditation and introduced a relaxation technique Attendees: 5/11 Attendance: Attended Patient's Goal:  *STG: Attends activities and groups Interventions/techniques: Informed and Supported Follows Directions: Followed directions Interactions: Interacted appropriately Mental Status: Calm Behavior/appearance: Cooperative Goals Achieved: Able to engage in interactions and Able to listen to others Additional Notes:  Pt was receptive to information and relaxation technique. Pt reported\" he has used meditation to help him to relax along with listening to music. \" David Juarez, CTRS
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
## 2021-06-11 VITALS
SYSTOLIC BLOOD PRESSURE: 100 MMHG | BODY MASS INDEX: 30.34 KG/M2 | TEMPERATURE: 97.8 F | HEART RATE: 54 BPM | OXYGEN SATURATION: 100 % | HEIGHT: 72 IN | RESPIRATION RATE: 18 BRPM | WEIGHT: 223.99 LBS | DIASTOLIC BLOOD PRESSURE: 61 MMHG

## 2021-06-11 PROCEDURE — 74011250637 HC RX REV CODE- 250/637: Performed by: PSYCHIATRY & NEUROLOGY

## 2021-06-11 RX ORDER — LANOLIN ALCOHOL/MO/W.PET/CERES
3 CREAM (GRAM) TOPICAL
Qty: 30 TABLET | Refills: 0 | Status: SHIPPED | OUTPATIENT
Start: 2021-06-11 | End: 2021-09-21

## 2021-06-11 RX ORDER — HYDROXYZINE 50 MG/1
25 TABLET, FILM COATED ORAL
Qty: 60 TABLET | Refills: 0 | Status: SHIPPED | OUTPATIENT
Start: 2021-06-11 | End: 2021-06-21

## 2021-06-11 RX ORDER — FLUOXETINE HYDROCHLORIDE 20 MG/1
20 CAPSULE ORAL DAILY
Qty: 30 CAPSULE | Refills: 0 | Status: SHIPPED | OUTPATIENT
Start: 2021-06-12 | End: 2021-09-21

## 2021-06-11 RX ORDER — TRAZODONE HYDROCHLORIDE 50 MG/1
50 TABLET ORAL
Qty: 30 TABLET | Refills: 0 | Status: SHIPPED | OUTPATIENT
Start: 2021-06-11 | End: 2021-09-21

## 2021-06-11 RX ADMIN — FLUOXETINE 20 MG: 20 CAPSULE ORAL at 09:41

## 2021-06-11 NOTE — GROUP NOTE
[facility] GROUP DOCUMENTATION INDIVIDUAL Group Therapy Note Date: 6/11/2021 Group Start Time: 1100 Group End Time: 1200 Group Topic: Psychological Group [facility] Babkamar Balloon R 
 
[facility] GROUP DOCUMENTATION GROUP Group Therapy Note Attendees: Patients discussed feelings and triggers sheet. Patients asked to identify emotions from a comprehensive list and then identify triggers that may be contributing to their feelings. Patients encouraged to discuss their feelings and triggers with their peers. Patients encouraged to share openly and honestly and be supportive of their peers. Attendance: Attended Patient's Goal:  Attends activities and groups Interventions/techniques: Informed, Validated, Promoted peer support, Reinforced and Supported Follows Directions: Followed directions Interactions: Interacted appropriately Mental Status: Calm, Congruent and Happy Behavior/appearance: Attentive and Cooperative Goals Achieved: Able to engage in interactions and Able to listen to others Additional Notes:  Pt attended group and was engaged. Pt completed worksheet but did not share. Xenia Schmitz

## 2021-06-11 NOTE — PROGRESS NOTES
Progress Note  Date:6/10/2021       Room:[facility]  Patient Name:Doug Batres     YOB: 1985     Age:35 y.o. Subjective    Subjective   Review of Systems  Objective         Vitals Last 24 Hours:  TEMPERATURE:  Temp  Av °F (36.7 °C)  Min: 97.6 °F (36.4 °C)  Max: 98.3 °F (36.8 °C)  RESPIRATIONS RANGE: Resp  Av.5  Min: 17  Max: 18  PULSE OXIMETRY RANGE: No data recorded  PULSE RANGE: Pulse  Av.5  Min: 57  Max: 90  BLOOD PRESSURE RANGE: Systolic (57HZH), BPM:994 , Min:106 , VHS:354   ; Diastolic (38HZL), SLO:83, Min:67, Max:74    I/O (24Hr): No intake or output data in the 24 hours ending 06/10/21 2305  Objective  Labs/Imaging/Diagnostics    Labs:  CBC:No results for input(s): WBC, RBC, HGB, HCT, MCV, RDW, PLT, HGBEXT, HCTEXT, PLTEXT in the last 72 hours. CHEMISTRIES:No results for input(s): NA, K, CL, CO2, BUN, CA, PHOS, MG in the last 72 hours. No lab exists for component: CREATININE, GLUCOSEPT/INR:No results for input(s): INR, INREXT in the last 72 hours. No lab exists for component: PROTIME  APTT:No results for input(s): APTT in the last 72 hours. LIVER PROFILE:No results for input(s): AST, ALT in the last 72 hours. No lab exists for component: EMMY MasPHOS  Lab Results   Component Value Date/Time    ALT (SGPT) 36 2021 12:50 PM    AST (SGOT) 16 2021 12:50 PM    Alk. phosphatase 75 2021 12:50 PM    Bilirubin, total 0.5 2021 12:50 PM       Imaging Last 24 Hours:  No results found.   Assessment//Plan   Active Problems:    Suicidal ideation (2021)      Depression (2021)      Suicide ([address] (2021)      Assessment & Plan    Electronically signed by Eleanor Guerin MD on 6/10/2021 at 11:05 PM

## 2021-06-11 NOTE — BH NOTES
Patient had been visible on the unit, walking around the unit. Very little, to no  interaction with peers. His affect is full. He denies depression, SI, HI, AH & VH. He on close observation for safety.

## 2021-06-11 NOTE — GROUP NOTE
IP  GROUP DOCUMENTATION INDIVIDUAL                                                                          Group Therapy Note    Date: 6/10/2021    Group Start Time: 2000  Group End Time: 2055  Group Topic: Recreational/Music Therapy    SR[facility] 2  NON ACUTE    Zakia Fuentes    IP [facility] GROUP DOCUMENTATION GROUP    Group Therapy Note    Attendees: 8/12  Introduce healthy leisure task skill as positive way to cope and manage mood. Attendance: Attended    Patient's Goal:  STG: Attends activities and groups      Interventions/techniques: Art integration, Provide feedback and Supported    Follows Directions: Followed directions    Interactions: Interacted appropriately    Mental Status: Calm and Flat    Behavior/appearance: Attentive and Cooperative    Goals Achieved: Able to engage in interactions and Able to listen to others    Additional Notes: Attended group late. Actively participated by selecting song to listen to . Was in and out of group area several times. Interacted with staff and verbalized enjoyment of  leisure skills group.      Sreekanth Lambert

## 2021-06-11 NOTE — BH NOTES
NURSING DISCHARGE NOTE    Discharged to home. Affect full/bright. Denied feeling depressed, anxious, and/or suicidal. Rx called in to pharmacy, by Dr. Shawna Homans, and he was also given a paper copy. Written f/u info given/explained; verbalized he understood. Pt reported he gave his ID to someone, in registration; however, he did not get it back. Security was called; officer Julaine Krabbe said he checked and did not find it; pt informed. Took all other personal belongings and valuables. Picked up by his wife; escorted down by Sofia Yusuf.

## 2021-06-11 NOTE — BH NOTES
SAFETY PLAN    A suicide Safety Plan is a document that supports someone when they are having thoughts of suicide. Warning Signs that indicate a suicidal crisis may be developing: What (situations, thoughts, feelings, body sensations, behaviors, etc.) do you experience that lets you know you are beginning to think about suicide? 1. Talking down to myself  2. Increase in anger and agitation  3. Punching walls    Internal Coping Strategies:  What things can I do (relaxation techniques, hobbies, physical activities, etc.) to take my mind off my problems without contacting another person? 1. Chewing gum  2. Go on a long drive  3. Go outside    People and social settings that provide distraction: Who can I call or where can I go to distract me? 1. Name: Marcelina Dull  Phone: Number in Phone  2. Name: [employer]  Phone: Number in Phone   3. Place: The river            4. Place: My truck    People whom I can ask for help: Who can I call when I need help - for example, friends, family, clergy, someone else? 1. Name: Marcelina Dull                Phone: Number in Phone  2. Name: [employer]  Phone: Number in Phone  3. Name: Elvin Cancer  Phone: Number in Phone    Professionals or [address] [place] [address] I can contact during a crisis: Who can I call for help - for example, my doctor, my psychiatrist, my psychologist, a mental health provider, a suicide hotline? 1. Clinician Name: [facility]   Phone: [phone]      Clinician Pager or Emergency Contact #: n/a    2. Clinician Name: [facility]   Phone: [phone]      Clinician Pager or Emergency Contact #: n/a    3. Suicide Prevention Lifeline: 2-270-002-TALK (8849)    4.  [address] [place] [address] Emergency Services -  for example, [facility], [facility] suicide hotline, [facility] Hotline: LIFE INTERACTION [facility]      Emergency Services Address: [address]  [place], [place] [address]      Emergency Services Phone: [phone]    Making the environment safe: How can I make my environment (house/apartment/living space) safer? For example, can I remove guns, medications, and other items? 1. Think before acting  2.  Have mornings to myself

## 2021-06-11 NOTE — BH NOTES
Behavioral Health Transition Record to Provider    Patient Name: Adrian Willingham  YOB: 1985  Medical Record Number: [id number]  Date of Admission: 2021  Date of Discharge: 2021    Attending Provider: Grace Molina MD  Discharging Provider: Dr. Edrick Apgar  To contact this individual call [phone] and ask the  to page. If unavailable, ask to be transferred to [facility] Provider on call. [facility] Provider will be available on call  and during holidays. Primary Care Provider: None    Allergies   Allergen Reactions    Remeron [Mirtazapine] Other (comments)       Reason for Admission: Suicidal Ideations    Admission Diagnosis: Suicidal ideation [R45.851]  Depression [F32.9]  Suicide ([facility] [address] Rance.Barthel. 8XXA]    * No surgery found *    Results for orders placed or performed during the hospital encounter of 21   SARS-COV-2   Result Value Ref Range    SARS-CoV-2 Not Detected Not Detected     CBC WITH AUTOMATED DIFF   Result Value Ref Range    WBC 6.0 4.1 - 11.1 K/uL    RBC 4.79 4.10 - 5.70 M/uL    HGB 15.0 12.1 - 17.0 g/dL    HCT 45.0 36.6 - 50.3 %    MCV 93.9 80.0 - 99.0 FL    MCH 31.3 26.0 - 34.0 PG    MCHC 33.3 30.0 - 36.5 g/dL    RDW 13.1 11.5 - 14.5 %    PLATELET 025 978 - 517 K/uL    MPV 11.8 8.9 - 12.9 FL    NRBC 0.0 0.0  WBC    ABSOLUTE NRBC 0.00 0.00 - 0.01 K/uL    NEUTROPHILS 57 32 - 75 %    LYMPHOCYTES 27 12 - 49 %    MONOCYTES 10 5 - 13 %    EOSINOPHILS 5 0 - 7 %    BASOPHILS 1 0 - 1 %    IMMATURE GRANULOCYTES 0 0 - 0.5 %    ABS. NEUTROPHILS 3.4 1.8 - 8.0 K/UL    ABS. LYMPHOCYTES 1.6 0.8 - 3.5 K/UL    ABS. MONOCYTES 0.6 0.0 - 1.0 K/UL    ABS. EOSINOPHILS 0.3 0.0 - 0.4 K/UL    ABS. BASOPHILS 0.0 0.0 - 0.1 K/UL    ABS. IMM.  GRANS. 0.0 0.00 - 0.04 K/UL    DF AUTOMATED     METABOLIC PANEL, COMPREHENSIVE   Result Value Ref Range    Sodium 141 136 - 145 mmol/L    Potassium 4.1 3.5 - 5.1 mmol/L    Chloride 107 97 - 108 mmol/L    CO2 29 21 - 32 mmol/L Anion gap 5 5 - 15 mmol/L    Glucose 101 (H) 65 - 100 mg/dL    BUN 19 6 - 20 mg/dL    Creatinine 1.12 0.70 - 1.30 mg/dL    BUN/Creatinine ratio 17 12 - 20      GFR est AA >60 >60 ml/min/1.73m2    GFR est non-AA >60 >60 ml/min/1.73m2    Calcium 9.2 8.5 - 10.1 mg/dL    Bilirubin, total 0.5 0.2 - 1.0 mg/dL    AST (SGOT) 16 15 - 37 U/L    ALT (SGPT) 36 12 - 78 U/L    Alk. phosphatase 75 45 - 117 U/L    Protein, total 7.7 6.4 - 8.2 g/dL    Albumin 3.9 3.5 - 5.0 g/dL    Globulin 3.8 2.0 - 4.0 g/dL    A-G Ratio 1.0 (L) 1.1 - 2.2     ETHYL ALCOHOL   Result Value Ref Range    ALCOHOL(ETHYL),SERUM <4 <10 mg/dL   DRUG SCREEN, URINE   Result Value Ref Range    AMPHETAMINES Negative Negative      BARBITURATES Negative Negative      BENZODIAZEPINES Negative Negative      COCAINE Negative Negative      METHADONE Negative Negative      OPIATES Negative Negative      PCP(PHENCYCLIDINE) Negative Negative      THC (TH-CANNABINOL) Negative Negative      Drug screen comment        This test is a screen for drugs of abuse in a medical setting only (i.e., they are unconfirmed results and as such must not be used for non-medical purposes, e.g.,employment testing, legal testing). Due to its inherent nature, false positive (FP) and false negative (FN) results may be obtained. Therefore, if necessary for medical care, recommend confirmation of positive findings by GC/MS.    URINALYSIS W/MICROSCOPIC   Result Value Ref Range    Color Yellow/Straw      Appearance Clear Clear      Specific gravity 1.014 1.003 - 1.030      pH (UA) 6.0 5.0 - 8.0      Protein Negative Negative mg/dL    Glucose Negative Negative mg/dL    Ketone Negative Negative mg/dL    Bilirubin Negative Negative      Blood Negative Negative      Urobilinogen 0.1 0.1 - 1.0 EU/dL    Nitrites Negative Negative      Leukocyte Esterase Negative Negative      WBC 0-4 0 - 4 /hpf    RBC 0-5 0 - 5 /hpf    Bacteria Negative Negative /hpf   SALICYLATE   Result Value Ref Range    Salicylate level <1.7 (L) 2.8 - 20.0 mg/dL    Reported dose date Blood      Reported dose: Blood Units   ACETAMINOPHEN   Result Value Ref Range    Acetaminophen level <10 (L) 10 - 30 ug/mL    Reported dose date Blood      Reported dose: Blood Units   SARS-COV-2   Result Value Ref Range    SARS-CoV-2 Please find results under separate order         Immunizations administered during this encounter: There is no immunization history on file for this patient. Screening for Metabolic Disorders for Patients on Antipsychotic Medications  (Data obtained from the EMR)    Estimated Body Mass Index  Estimated body mass index is 30.38 kg/m² as calculated from the following:    Height as of this encounter: 6' (1.829 m). Weight as of this encounter: 101.6 kg (223 lb 15.8 oz).      Vital Signs/Blood Pressure  Visit Vitals  /61   Pulse (!) 54   Temp 97.8 °F (36.6 °C)   Resp 18   Ht 6' (1.829 m)   Wt 101.6 kg (223 lb 15.8 oz)   SpO2 100%   BMI 30.38 kg/m²       Blood Glucose/Hemoglobin A1c  Lab Results   Component Value Date/Time    Glucose 101 (H) 06/04/2021 12:50 PM       No results found for: HBA1C, [id number]     Lipid Panel  No results found for: CHOL, CHOLX, CHLST, CHOLV, 403679, HDL, HDLP, LDL, LDLC, DLDLP, TGLX, TRIGL, TRIGP, CHHD, CHHDX     Discharge Diagnosis: Major Depression    Discharge Plan: Patient to discharge home and follow up with [facility] outpatient    Discharge Medication List and Instructions:   Current Discharge Medication List          Unresulted Labs (24h ago, onward) Comment    None        To obtain results of studies pending at discharge, please contact [phone]    Follow-up Information     Follow up With Specialties Details [place] [address] and Family Services  Call in 2 days For continued care They will be calling you within 48 hours to set up appointment, if you do not hear back withing 48 hours, call [phone]          Advanced Directive:   Does the patient have an appointed surrogate decision maker? No  Does the patient have a Medical Advance Directive? No  Does the patient have a Psychiatric Advance Directive? No  If the patient does not have a surrogate or Medical Advance Directive AND Psychiatric Advance Directive, the patient was offered information on these advance directives Patient will complete at a later time    Patient Instructions: Please continue all medications until otherwise directed by physician. Patient to discharge home and follow up with outpatient therapy. Tobacco Cessation Discharge Plan:   Is the patient a smoker and needs referral for smoking cessation? Yes  Patient referred to the following for smoking cessation with an appointment? Refused     Patient was offered medication to assist with smoking cessation at discharge? Refused  Was education for smoking cessation added to the discharge instructions? Refused    Alcohol/Substance Abuse Discharge Plan:   Does the patient have a history of substance/alcohol abuse and requires a referral for treatment? No  Patient referred to the following for substance/alcohol abuse treatment with an appointment? Not applicable  Patient was offered medication to assist with alcohol cessation at discharge? Not applicable  Was education for substance/alcohol abuse added to discharge instructions?  Not applicable    Patient discharged to Home; discussed with patient/caregiver

## 2021-06-11 NOTE — BH NOTES
DISCHARGE SUMMARY    NAME:Doug Batres  : 1985  MRN: [id number]    The patient Nicola Aldridge exhibits the ability to control behavior in a less restrictive environment. Patient's level of functioning is improving. No assaultive/destructive behavior has been observed for the past 24 hours. No suicidal/homicidal threat or behavior has been observed for the past 24 hours. There is no evidence of serious medication side effects. Patient has not been in physical or protective restraints for at least the past 24 hours. If weapons involved, how are they secured? Pt denies access to weapons    Is patient aware of and in agreement with discharge plan? Yes, pt to discharge home and follow up with outpatient therapy. Arrangements for medication:  Prescriptions given to patient, given a weeks supply or 30 day supply. Copy of discharge instructions to provider?:  Yes [facility]    Arrangements for transportation home:  Yes, wife to  patient    Keep all follow up appointments as scheduled, continue to take prescribed medications per physician instructions.   Mental health crisis number:  210 or your local mental health crisis line number at [address] [facility] Emergency WARM LINE      5-727-949-MHAV (2873)      M-F: 9am to 9pm      Sat & Sun: 5pm  9pm  National suicide prevention lines:                             8-604-UOLBKMM ([phone])       4-952-312-TALK ([phone])    Crisis Text Line:  Text HOME to 564366

## 2021-06-11 NOTE — BH NOTES
Could read dictating a progress note on Musa Back she note blood pressure 118/74 pulse 90 temperature 98.3 respiration 18 discussed in the treatment team patient doing a good progress individual therapy group therapy family therapy both  and wife acknowledging each other's needs and difficulties want to work on issues but also centimeters therapist outside wife patient's wife is going to have surgery on the Monday he want to be home he is doing well anticipate discharge tomorrow no side effect compliant with medications continued inpatient level of care indicated

## 2021-07-24 NOTE — DISCHARGE SUMMARY
[address] SUMMARY    Name:  Marcelo Agarwal  MR#:  [id number]  :  1985  ACCOUNT #:  [de-identified]  ADMIT DATE:  2021  DISCHARGE DATE:  2021      Please make reference to Dr. Boris Caal.    CHIEF COMPLAINT:  Depression, mood swings, feeling overwhelmed, suicidal ideation, plan to jump off a bridge. HISTORY OF PRESENT ILLNESS:  The patient with a history of depression, recent stressors, he was cheating, his wife left him, took the kids away, he wanted to get back with her. He was feeling overwhelmed, hopeless, helpless, poor motivation, difficulty getting out of the bed. No job, poor sleep, only getting two hours, racing thoughts, impulsive, anxiety, guilt, anhedonia, suicidal ideation. Initially, he had no plan, but later he said he wanted to jump off a bridge. Denied homicidal ideations. ALLERGIES:  HE WAS ALLERGIC TO REMERON. PAST HISTORY:  [facility].  Denies any drug abuse. COURSE AND TREATMENT DURING HOSPITALIZATION:  Dr. Marianne Young admitted the patient, started him on antidepressant medications. Prozac 10 mg daily. Participated in individual therapy, group therapy, learning coping skills and stress management. He and his wife also had a conversation, some reconciliation work on the marriage, and discharged with a followup arranged. LABORATORY DATA:  COVID negative. CBC unremarkable. CMP unremarkable. Blood alcohol less than 4. Drug screen was negative. Urinalysis unremarkable. Salicylate less than 1.7 and acetaminophen less than 10. VITAL SIGNS AT THE TIME OF DISCHARGE:   Blood pressure 100/61, pulse 54, temperature 97.8, respirations 18. Height 6 feet, weight 101.6 kilograms. SpO2 of 100% on room air. He was discharged with followup with  and [facility].   Discharge medications were provided, see the list.      MD LYSSA Parson/[id number]  D:  2021 15:47  T:  2021 1: [id number] [facility] #:  [id number]

## 2021-09-19 ENCOUNTER — HOSPITAL ENCOUNTER (EMERGENCY)
Age: 36
Discharge: PSYCHIATRIC OR SUBSTANCE USE FACILITY | End: 2021-09-20
Attending: EMERGENCY MEDICINE
Payer: MEDICAID

## 2021-09-19 DIAGNOSIS — F32.A DEPRESSION, UNSPECIFIED DEPRESSION TYPE: ICD-10-CM

## 2021-09-19 DIAGNOSIS — R45.851 SUICIDAL IDEATION: Primary | ICD-10-CM

## 2021-09-19 LAB
ALBUMIN SERPL-MCNC: 3.6 G/DL (ref 3.5–5)
ALBUMIN/GLOB SERPL: 0.9 {RATIO} (ref 1.1–2.2)
ALP SERPL-CCNC: 74 U/L (ref 45–117)
ALT SERPL-CCNC: 29 U/L (ref 12–78)
AMPHET UR QL SCN: NEGATIVE
ANION GAP SERPL CALC-SCNC: 8 MMOL/L (ref 5–15)
APPEARANCE UR: CLEAR
AST SERPL-CCNC: 17 U/L (ref 15–37)
BACTERIA URNS QL MICRO: NEGATIVE /HPF
BARBITURATES UR QL SCN: NEGATIVE
BASOPHILS # BLD: 0.1 K/UL (ref 0–0.1)
BASOPHILS NFR BLD: 1 % (ref 0–1)
BENZODIAZ UR QL: NEGATIVE
BILIRUB SERPL-MCNC: 0.4 MG/DL (ref 0.2–1)
BILIRUB UR QL CFM: NEGATIVE
BUN SERPL-MCNC: 14 MG/DL (ref 6–20)
BUN/CREAT SERPL: 12 (ref 12–20)
CALCIUM SERPL-MCNC: 8.9 MG/DL (ref 8.5–10.1)
CANNABINOIDS UR QL SCN: NEGATIVE
CHLORIDE SERPL-SCNC: 106 MMOL/L (ref 97–108)
CO2 SERPL-SCNC: 26 MMOL/L (ref 21–32)
COCAINE UR QL SCN: NEGATIVE
COLOR UR: ABNORMAL
CREAT SERPL-MCNC: 1.2 MG/DL (ref 0.7–1.3)
DIFFERENTIAL METHOD BLD: NORMAL
DRUG SCRN COMMENT,DRGCM: NORMAL
EOSINOPHIL # BLD: 0.3 K/UL (ref 0–0.4)
EOSINOPHIL NFR BLD: 4 % (ref 0–7)
EPITH CASTS URNS QL MICRO: ABNORMAL /LPF
ERYTHROCYTE [DISTWIDTH] IN BLOOD BY AUTOMATED COUNT: 12.8 % (ref 11.5–14.5)
ETHANOL SERPL-MCNC: <10 MG/DL
FLUAV RNA SPEC QL NAA+PROBE: NOT DETECTED
FLUBV RNA SPEC QL NAA+PROBE: NOT DETECTED
GLOBULIN SER CALC-MCNC: 3.9 G/DL (ref 2–4)
GLUCOSE SERPL-MCNC: 99 MG/DL (ref 65–100)
GLUCOSE UR STRIP.AUTO-MCNC: NEGATIVE MG/DL
HCT VFR BLD AUTO: 39.4 % (ref 36.6–50.3)
HGB BLD-MCNC: 13.3 G/DL (ref 12.1–17)
HGB UR QL STRIP: NEGATIVE
IMM GRANULOCYTES # BLD AUTO: 0 K/UL (ref 0–0.04)
IMM GRANULOCYTES NFR BLD AUTO: 0 % (ref 0–0.5)
KETONES UR QL STRIP.AUTO: NEGATIVE MG/DL
LEUKOCYTE ESTERASE UR QL STRIP.AUTO: NEGATIVE
LYMPHOCYTES # BLD: 1.8 K/UL (ref 0.8–3.5)
LYMPHOCYTES NFR BLD: 25 % (ref 12–49)
MCH RBC QN AUTO: 31.7 PG (ref 26–34)
MCHC RBC AUTO-ENTMCNC: 33.8 G/DL (ref 30–36.5)
MCV RBC AUTO: 93.8 FL (ref 80–99)
METHADONE UR QL: NEGATIVE
MONOCYTES # BLD: 0.7 K/UL (ref 0–1)
MONOCYTES NFR BLD: 10 % (ref 5–13)
MUCOUS THREADS URNS QL MICRO: ABNORMAL /LPF
NEUTS SEG # BLD: 4.3 K/UL (ref 1.8–8)
NEUTS SEG NFR BLD: 60 % (ref 32–75)
NITRITE UR QL STRIP.AUTO: NEGATIVE
NRBC # BLD: 0 K/UL (ref 0–0.01)
NRBC BLD-RTO: 0 PER 100 WBC
OPIATES UR QL: NEGATIVE
PCP UR QL: NEGATIVE
PH UR STRIP: 6 [PH] (ref 5–8)
PLATELET # BLD AUTO: 212 K/UL (ref 150–400)
PMV BLD AUTO: 11.4 FL (ref 8.9–12.9)
POTASSIUM SERPL-SCNC: 3.8 MMOL/L (ref 3.5–5.1)
PROT SERPL-MCNC: 7.5 G/DL (ref 6.4–8.2)
PROT UR STRIP-MCNC: NEGATIVE MG/DL
RBC # BLD AUTO: 4.2 M/UL (ref 4.1–5.7)
RBC #/AREA URNS HPF: ABNORMAL /HPF (ref 0–5)
SARS-COV-2, COV2: NOT DETECTED
SODIUM SERPL-SCNC: 140 MMOL/L (ref 136–145)
SP GR UR REFRACTOMETRY: 1.03 (ref 1–1.03)
UA: UC IF INDICATED,UAUC: ABNORMAL
UROBILINOGEN UR QL STRIP.AUTO: 1 EU/DL (ref 0.2–1)
WBC # BLD AUTO: 7.1 K/UL (ref 4.1–11.1)
WBC URNS QL MICRO: ABNORMAL /HPF (ref 0–4)

## 2021-09-19 PROCEDURE — 99284 EMERGENCY DEPT VISIT MOD MDM: CPT

## 2021-09-19 PROCEDURE — 85025 COMPLETE CBC W/AUTO DIFF WBC: CPT

## 2021-09-19 PROCEDURE — 81001 URINALYSIS AUTO W/SCOPE: CPT

## 2021-09-19 PROCEDURE — 87636 SARSCOV2 & INF A&B AMP PRB: CPT

## 2021-09-19 PROCEDURE — 36415 COLL VENOUS BLD VENIPUNCTURE: CPT

## 2021-09-19 PROCEDURE — 80053 COMPREHEN METABOLIC PANEL: CPT

## 2021-09-19 PROCEDURE — 82077 ASSAY SPEC XCP UR&BREATH IA: CPT

## 2021-09-19 PROCEDURE — 80307 DRUG TEST PRSMV CHEM ANLYZR: CPT

## 2021-09-20 ENCOUNTER — HOSPITAL ENCOUNTER (INPATIENT)
Age: 36
LOS: 1 days | Discharge: HOME | DRG: 755 | End: 2021-09-21
Attending: PSYCHIATRY & NEUROLOGY | Admitting: PSYCHIATRY & NEUROLOGY
Payer: MEDICAID

## 2021-09-20 VITALS
SYSTOLIC BLOOD PRESSURE: 109 MMHG | HEART RATE: 65 BPM | WEIGHT: 210 LBS | HEIGHT: 73 IN | OXYGEN SATURATION: 99 % | BODY MASS INDEX: 27.83 KG/M2 | TEMPERATURE: 98.9 F | DIASTOLIC BLOOD PRESSURE: 68 MMHG | RESPIRATION RATE: 15 BRPM

## 2021-09-20 PROBLEM — F43.25 ADJUSTMENT DISORDER WITH MIXED DISTURBANCE OF EMOTIONS AND CONDUCT: Status: ACTIVE | Noted: 2021-09-20

## 2021-09-20 PROBLEM — F33.2 SEVERE RECURRENT MAJOR DEPRESSION WITHOUT PSYCHOTIC FEATURES (HCC): Status: ACTIVE | Noted: 2021-09-20
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
## 2021-09-20 PROBLEM — F32.A DEPRESSION: Status: RESOLVED | Noted: 2021-06-04 | Resolved: 2021-09-20

## 2021-09-20 PROBLEM — F32.9 MAJOR DEPRESSIVE DISORDER: Status: ACTIVE | Noted: 2021-09-20

## 2021-09-20 PROCEDURE — 65220000003 HC RM SEMIPRIVATE PSYCH

## 2021-09-20 PROCEDURE — 74011250637 HC RX REV CODE- 250/637: Performed by: PSYCHIATRY & NEUROLOGY

## 2021-09-20 PROCEDURE — 90791 PSYCH DIAGNOSTIC EVALUATION: CPT | Performed by: PSYCHIATRY & NEUROLOGY

## 2021-09-20 RX ORDER — IBUPROFEN 400 MG/1
400 TABLET ORAL
Status: DISCONTINUED | OUTPATIENT
Start: 2021-09-20 | End: 2021-09-21 | Stop reason: HOSPADM

## 2021-09-20 RX ORDER — BUPROPION HYDROCHLORIDE 150 MG/1
150 TABLET, EXTENDED RELEASE ORAL DAILY
Status: DISCONTINUED | OUTPATIENT
Start: 2021-09-20 | End: 2021-09-21 | Stop reason: HOSPADM

## 2021-09-20 RX ORDER — TRAZODONE HYDROCHLORIDE 50 MG/1
50 TABLET ORAL
Status: DISCONTINUED | OUTPATIENT
Start: 2021-09-20 | End: 2021-09-21 | Stop reason: HOSPADM

## 2021-09-20 RX ORDER — ACETAMINOPHEN 325 MG/1
650 TABLET ORAL
Status: DISCONTINUED | OUTPATIENT
Start: 2021-09-20 | End: 2021-09-21 | Stop reason: HOSPADM

## 2021-09-20 RX ORDER — HYDROXYZINE PAMOATE 50 MG/1
50 CAPSULE ORAL
Status: DISCONTINUED | OUTPATIENT
Start: 2021-09-20 | End: 2021-09-21 | Stop reason: HOSPADM

## 2021-09-20 RX ORDER — IBUPROFEN 200 MG
1 TABLET ORAL
Status: DISCONTINUED | OUTPATIENT
Start: 2021-09-20 | End: 2021-09-21

## 2021-09-20 RX ORDER — ADHESIVE BANDAGE
30 BANDAGE TOPICAL DAILY PRN
Status: DISCONTINUED | OUTPATIENT
Start: 2021-09-20 | End: 2021-09-21 | Stop reason: HOSPADM

## 2021-09-20 RX ADMIN — TRAZODONE HYDROCHLORIDE 50 MG: 50 TABLET ORAL at 22:34

## 2021-09-20 RX ADMIN — BUPROPION HYDROCHLORIDE 150 MG: 150 TABLET, EXTENDED RELEASE ORAL at 11:25

## 2021-09-20 NOTE — BH NOTES
Affect blunted/constricted, mood depressed. Patient is alert & oriented x 4. Speech clear and coherent. Patient answers questions appropriately. Patient denies SI/HI/AVH/pain. No delusional statements made. Patient is compliant with medications. Patient appetite good eats 100% of all meals plus snacks. Patient declined all groups. Patient in dayroom talking and interacting appropriately with peers and staff. Patient in no apparent distress all vital signs within normal limits. Patient TDO hearing tomorrow morning.

## 2021-09-20 NOTE — BH NOTES
[address] [facility] [address]  Master Treatment Plan for [employer]    Date Treatment Plan Initiated: 09/20/2021    Treatment Plan Modalities:  Type of Modality Amount  (x minutes) Frequency (x/week) Duration (x days) Name of Responsible Staff   [address] [facility] meetings to encourage peer interactions 30 14 1 Radha Emmanuel, CNA/MHT  Bisi Kilgore, MHT   Group psychotherapy to assist in building coping skills and internal controls 60 5 1 Shelly Sutton, HILLW  Verangela Escobar., [facility]   Therapeutic activity groups to build coping skills 61 7 1 Rajni Peace., MHT     Psychoeducation in group setting to address:   Medication education  Coping Skills  Symptom Management   60 7 1 Shelly Sutton, LCSW  Kelly Pat., [facility]  [address] [facility]., RN  Sean Howard RN   Discharge planning   60 2 1 Christy Rodrigues.    Spirituality    60 2 1 Kimberly Jarvis   Physician medication management   15 7 1 MD KATIA Fontenot. Hermelinda Ordonez MD                                       Treatment Team Signatures    I have participated in the development of this plan of treatment and agree to its implementation.     Patient Signature       Patient Printed Name Date/Time   Social Work/Therapist Signature       Social Work/Therapist Printed Name Date/Time   RN Signature       RN Printed Name  Prince Lightkenan Date/Time  09/20/2021  @ 9056   Other Signature     Other Signature Date/Time   MD Signature       MD Printed Name Date/Time

## 2021-09-20 NOTE — BH NOTES
TRANSFER - IN REPORT:    Verbal report received from Milly Ward RN(name) on [employer]  being received from [address] [place] [address] ED(unit) for routine progression of care      Report consisted of patients Situation, Background, Assessment and   Recommendations(SBAR). Information from the following report(s) SBAR, ED Summary, [facility] and Recent Results was reviewed with the receiving nurse. Opportunity for questions and clarification was provided. Assessment completed upon patients arrival to unit and care assumed.

## 2021-09-20 NOTE — ED NOTES
TRANSFER - OUT REPORT:    Verbal report given to [facility] HOSP SARYLEX PETERS on [employer]  being transferred to [address] [facility] general room 236 for routine progression of care       Report consisted of patients Situation, Background, Assessment and   Recommendations(SBAR). Information from the following report(s) SBAR was reviewed with the receiving nurse. Lines:       Opportunity for questions and clarification was provided.       Patient transported with:  Tatum Banks
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
## 2021-09-20 NOTE — CONSULTS
Hospitalist Consultation Note    NAME:  Theron Khan   :   1985   MRN:   [id number]     ATTENDING: Marium Beck MD  PCP:  None    Date/Time:  2021 3:45 PM      Recommendations/Plan:       Principal Problem:    Severe recurrent major depression without psychotic features ([facility] [address] (2021)    Active Problems:    Adjustment disorder with mixed disturbance of emotions and conduct (2021)    management as per Psych    Tobacco Use  Encouraged cessation      Code Status: Full  DVT Prophylaxis: ambulatory, none needed          Subjective:   REQUESTING PHYSICIAN:  REASON FOR CONSULT:      Shani Nunn is a 38 y.o.  male who I was asked to see for medical evaluation. Patient has been having relationship difficulties and has been feeling depressed with suicidal ideation (without a plan). Pt is in [place] for involuntary admission because officers found him walking on the bridge and thought pt was intending to jump off and kill himself. Regarding pt's medical hx he states he is a former heavy tobacco smoker (still smokes Black [employer] Milds). Patient does not take medications daily. Past Medical History:   Diagnosis Date    Depression       No past surgical history on file. Social History     Tobacco Use    Smoking status: Current Every Day Smoker     Packs/day: 1.00    Smokeless tobacco: Never Used    Tobacco comment: one cigar per day   Substance Use Topics    Alcohol use: Not Currently      No family history on file. Reviewed and non contributory  Allergies   Allergen Reactions    Remeron [Mirtazapine] Other (comments)      Prior to Admission medications    Medication Sig Start Date End Date Taking? Authorizing Provider   FLUoxetine (PROzac) 20 mg capsule Take 1 Capsule by mouth daily. Patient not taking: Reported on 2021   Valerie Ken MD   melatonin 3 mg tablet Take 1 Tablet by mouth nightly as needed for Insomnia.   Patient not taking: Reported on 2021 Valerie Ken MD   traZODone (DESYREL) 50 mg tablet Take 1 Tablet by mouth nightly as needed for Sleep (For insomnia). Patient not taking: Reported on 2021   Valerie Ken MD       REVIEW OF SYSTEMS:     Total of 12 systems reviewed as follows:   I am not able to complete the review of systems because: The patient is intubated and sedated    The patient has altered mental status due to his acute medical problems    The patient has baseline aphasia from prior stroke(s)    The patient has baseline dementia and is not reliable historian                 POSITIVE= underlined text  Negative = text not underlined  General:  fever, chills, sweats, generalized weakness, weight loss/gain,      loss of appetite   Eyes:    blurred vision, eye pain, loss of vision, double vision  ENT:    rhinorrhea, pharyngitis   Respiratory:   cough, sputum production, SOB, wheezing, SPIVEY, pleuritic pain   Cardiology:   chest pain, palpitations, orthopnea, PND, edema, syncope   Gastrointestinal:  abdominal pain , N/V, dysphagia, diarrhea, constipation, bleeding   Genitourinary:  frequency, urgency, dysuria, hematuria, incontinence   Muskuloskeletal :  arthralgia, myalgia   Hematology:  easy bruising, nose or gum bleeding, lymphadenopathy   Dermatological: rash, ulceration, pruritis   Endocrine:   hot flashes or polydipsia   Neurological:  headache, dizziness, confusion, focal weakness, paresthesia,     Speech difficulties, memory loss, gait disturbance  Psychological: Feelings of anxiety, depression, agitation    Objective:   VITALS:    Visit Vitals  BP (!) 107/52   Pulse 70   Temp (!) 96.5 °F (35.8 °C)   Resp 18   Ht 6' 1\" (1.854 m)   Wt 94.3 kg (208 lb)   SpO2 96%   BMI 27.44 kg/m²     Temp (24hrs), Av.8 °F (36.6 °C), Min:96.5 °F (35.8 °C), Max:98.9 °F (37.2 °C)      PHYSICAL EXAM:   General:    Alert, cooperative, no distress, appears stated age.      HEENT: Atraumatic, anicteric sclerae, pink conjunctivae     No oral ulcers, mucosa moist, throat clear  Neck:  Supple, symmetrical,  thyroid: non tender  Lungs:   Clear to auscultation bilaterally. No Wheezing or Rhonchi. No rales. Chest wall:  No tenderness  No Accessory muscle use. Heart:   Regular  rhythm,  No  murmur   No edema  Abdomen:   Soft, non-tender. Not distended. Bowel sounds normal  Extremities: No cyanosis. No clubbing  Skin:     Not pale. Not Jaundiced  No rashes   Psych:  Good insight. Not depressed. Not anxious or agitated. Neurologic: EOMs intact. No facial asymmetry. No aphasia or slurred speech. Symmetrical strength, Alert and oriented X 4.     _______________________________________________________________________  Care Plan discussed with:    Comments   Patient x    Family      RN x    Care Manager                    Consultant:      ____________________________________________________________________  TOTAL TIME:     50 mins    Comments    x Reviewed previous records   >50% of visit spent in counseling and coordination of care x Discussion with patient and/or family and questions answered       Critical Care Provided     Minutes non procedure based  ________________________________________________________________________  Signed: Daisha Keller DO      Procedures: see electronic medical records for all procedures/Xrays and details which were not copied into this note but were reviewed prior to creation of Plan. LAB DATA REVIEWED:    Recent Results (from the past 24 hour(s))   CBC WITH AUTOMATED DIFF    Collection Time: 09/19/21 10:10 PM   Result Value Ref Range    WBC 7.1 4.1 - 11.1 K/uL    RBC 4.20 4. 10 - 5.70 M/uL    HGB 13.3 12.1 - 17.0 g/dL    HCT 39.4 36.6 - 50.3 %    MCV 93.8 80.0 - 99.0 FL    MCH 31.7 26.0 - 34.0 PG    MCHC 33.8 30.0 - 36.5 g/dL    RDW 12.8 11.5 - 14.5 %    PLATELET 219 637 - 171 K/uL    MPV 11.4 8.9 - 12.9 FL    NRBC 0.0 0  WBC    ABSOLUTE NRBC 0.00 0.00 - 0.01 K/uL    NEUTROPHILS 60 32 - 75 %    LYMPHOCYTES 25 12 - 49 %    MONOCYTES 10 5 - 13 %    EOSINOPHILS 4 0 - 7 %    BASOPHILS 1 0 - 1 %    IMMATURE GRANULOCYTES 0 0.0 - 0.5 %    ABS. NEUTROPHILS 4.3 1.8 - 8.0 K/UL    ABS. LYMPHOCYTES 1.8 0.8 - 3.5 K/UL    ABS. MONOCYTES 0.7 0.0 - 1.0 K/UL    ABS. EOSINOPHILS 0.3 0.0 - 0.4 K/UL    ABS. BASOPHILS 0.1 0.0 - 0.1 K/UL    ABS. IMM. GRANS. 0.0 0.00 - 0.04 K/UL    DF AUTOMATED     METABOLIC PANEL, COMPREHENSIVE    Collection Time: 09/19/21 10:10 PM   Result Value Ref Range    Sodium 140 136 - 145 mmol/L    Potassium 3.8 3.5 - 5.1 mmol/L    Chloride 106 97 - 108 mmol/L    CO2 26 21 - 32 mmol/L    Anion gap 8 5 - 15 mmol/L    Glucose 99 65 - 100 mg/dL    BUN 14 6 - 20 MG/DL    Creatinine 1.20 0.70 - 1.30 MG/DL    BUN/Creatinine ratio 12 12 - 20      GFR est AA >60 >60 ml/min/1.73m2    GFR est non-AA >60 >60 ml/min/1.73m2    Calcium 8.9 8.5 - 10.1 MG/DL    Bilirubin, total 0.4 0.2 - 1.0 MG/DL    ALT (SGPT) 29 12 - 78 U/L    AST (SGOT) 17 15 - 37 U/L    Alk.  phosphatase 74 45 - 117 U/L    Protein, total 7.5 6.4 - 8.2 g/dL    Albumin 3.6 3.5 - 5.0 g/dL    Globulin 3.9 2.0 - 4.0 g/dL    A-G Ratio 0.9 (L) 1.1 - 2.2     ETHYL ALCOHOL    Collection Time: 09/19/21 10:10 PM   Result Value Ref Range    ALCOHOL(ETHYL),SERUM <10 <10 MG/DL   COVID-19 WITH INFLUENZA A/B    Collection Time: 09/19/21 10:14 PM   Result Value Ref Range    SARS-CoV-2 Not detected NOTD      Influenza A by PCR Not detected      Influenza B by PCR Not detected     DRUG SCREEN, URINE    Collection Time: 09/19/21 10:39 PM   Result Value Ref Range    AMPHETAMINES Negative NEG      BARBITURATES Negative NEG      BENZODIAZEPINES Negative NEG      COCAINE Negative NEG      METHADONE Negative NEG      OPIATES Negative NEG      PCP(PHENCYCLIDINE) Negative NEG      THC (TH-CANNABINOL) Negative NEG      Drug screen comment (NOTE)    URINALYSIS W/ REFLEX CULTURE    Collection Time: 09/19/21 10:39 PM    Specimen: Miscellaneous sample; Urine    Urine specimen   Result Value Ref Range    Color YELLOW/STRAW      Appearance CLEAR CLEAR      Specific gravity 1.030 1.003 - 1.030      pH (UA) 6.0 5.0 - 8.0      Protein Negative NEG mg/dL    Glucose Negative NEG mg/dL    Ketone Negative NEG mg/dL    Blood Negative NEG      Urobilinogen 1.0 0.2 - 1.0 EU/dL    Nitrites Negative NEG      Leukocyte Esterase Negative NEG      WBC 0-4 0 - 4 /hpf    RBC 0-5 0 - 5 /hpf    Epithelial cells FEW FEW /lpf    Bacteria Negative NEG /hpf    UA:UC IF INDICATED CULTURE NOT INDICATED BY UA RESULT CNI      Mucus 2+ (A) NEG /lpf   BILIRUBIN, CONFIRM    Collection Time: 09/19/21 10:39 PM   Result Value Ref Range    Bilirubin UA, confirm Negative NEG         _____________________________  Hospitalist: Raisa Robbins DO

## 2021-09-20 NOTE — BH NOTES
Pt arrived on unit at 0440 on a TDO escorted by [facility] police officers from [facility] ED. Pt is alert and oriented X 4 and  Dr. Indigo Love, Dr Meño Barros and Nursing supervisor notified. Pt denied having a license with the [address] Pt denies that he smokes. Pt denies that he drinks alcohol. Treatment to focus on decreasing SI, decreasing depression, increasing coping skills, medication management,  management of anxiety, and group therapy. Pt placed on suicide precaution, fall prevention, and q 15 minute safety checks.

## 2021-09-20 NOTE — ED PROVIDER NOTES
EMERGENCY DEPARTMENT HISTORY AND PHYSICAL EXAM      Date: 9/19/2021  Patient Name: Severa Cornwall    History of Presenting Illness     Chief Complaint   Patient presents with    Mental Health Problem       History Provided By: Patient, police    HPI: Severa Cornwall, 38 y.o. male with PMHx significant for depression who presents in police custody for mental health problem. Patient reports he got an argument with his wife while they were driving in the car. He states she was making threats about taking the kids away and never letting him see the kids. They are currently going through a separation. He got out of the car and his wife drove off. He states that he was walking across the bridge but never told anyone he was going to jump. Per police, there were multiple calls that the patient was sitting on the edge of the bridge as if he was about to jump. Patient reports a hospitalization 4 months ago for depression. States he is not taking any depression medication currently. Denies any drug or alcohol use. PCP: None    There are no other complaints, changes, or physical findings at this time. Current Outpatient Medications   Medication Sig Dispense Refill    FLUoxetine (PROzac) 20 mg capsule Take 1 Capsule by mouth daily. 30 Capsule 0    melatonin 3 mg tablet Take 1 Tablet by mouth nightly as needed for Insomnia. 30 Tablet 0    traZODone (DESYREL) 50 mg tablet Take 1 Tablet by mouth nightly as needed for Sleep (For insomnia). 30 Tablet 0     Past History     Past Medical History:  Past Medical History:   Diagnosis Date    Depression      Past Surgical History:  History reviewed. No pertinent surgical history. Family History:  History reviewed. No pertinent family history.   Social History:  Social History     Tobacco Use    Smoking status: Current Every Day Smoker     Packs/day: 1.00    Smokeless tobacco: Never Used    Tobacco comment: one cigar per day   Vaping Use    Vaping Use: Never used Substance Use Topics    Alcohol use: Not Currently    Drug use: Not Currently     Allergies: Allergies   Allergen Reactions    Remeron [Mirtazapine] Other (comments)     Review of Systems   Review of Systems   Constitutional: Negative for chills and fever. HENT: Negative for congestion, rhinorrhea and sore throat. Respiratory: Negative for cough and shortness of breath. Cardiovascular: Negative for chest pain. Gastrointestinal: Negative for abdominal pain, nausea and vomiting. Genitourinary: Negative for dysuria and urgency. Skin: Negative for rash. Neurological: Negative for dizziness, light-headedness and headaches. Psychiatric/Behavioral: Positive for dysphoric mood. All other systems reviewed and are negative. Physical Exam   Physical Exam  Vitals and nursing note reviewed. Constitutional:       General: He is not in acute distress. Appearance: He is well-developed. HENT:      Head: Normocephalic and atraumatic. Eyes:      Conjunctiva/sclera: Conjunctivae normal.      Pupils: Pupils are equal, round, and reactive to light. Cardiovascular:      Rate and Rhythm: Normal rate and regular rhythm. Pulmonary:      Effort: Pulmonary effort is normal. No respiratory distress. Breath sounds: Normal breath sounds. No stridor. Abdominal:      General: There is no distension. Palpations: Abdomen is soft. Tenderness: There is no abdominal tenderness. Musculoskeletal:         General: Normal range of motion. Cervical back: Normal range of motion. Skin:     General: Skin is warm and dry. Neurological:      Mental Status: He is alert and oriented to person, place, and time. Psychiatric:         Thought Content: Thought content includes suicidal ideation. Thought content includes suicidal plan.        Diagnostic Study Results   Labs -     Recent Results (from the past 12 hour(s))   CBC WITH AUTOMATED DIFF    Collection Time: 09/19/21 10:10 PM   Result Value Ref Range    WBC 7.1 4.1 - 11.1 K/uL    RBC 4.20 4. 10 - 5.70 M/uL    HGB 13.3 12.1 - 17.0 g/dL    HCT 39.4 36.6 - 50.3 %    MCV 93.8 80.0 - 99.0 FL    MCH 31.7 26.0 - 34.0 PG    MCHC 33.8 30.0 - 36.5 g/dL    RDW 12.8 11.5 - 14.5 %    PLATELET 307 703 - 596 K/uL    MPV 11.4 8.9 - 12.9 FL    NRBC 0.0 0  WBC    ABSOLUTE NRBC 0.00 0.00 - 0.01 K/uL    NEUTROPHILS 60 32 - 75 %    LYMPHOCYTES 25 12 - 49 %    MONOCYTES 10 5 - 13 %    EOSINOPHILS 4 0 - 7 %    BASOPHILS 1 0 - 1 %    IMMATURE GRANULOCYTES 0 0.0 - 0.5 %    ABS. NEUTROPHILS 4.3 1.8 - 8.0 K/UL    ABS. LYMPHOCYTES 1.8 0.8 - 3.5 K/UL    ABS. MONOCYTES 0.7 0.0 - 1.0 K/UL    ABS. EOSINOPHILS 0.3 0.0 - 0.4 K/UL    ABS. BASOPHILS 0.1 0.0 - 0.1 K/UL    ABS. IMM. GRANS. 0.0 0.00 - 0.04 K/UL    DF AUTOMATED     METABOLIC PANEL, COMPREHENSIVE    Collection Time: 09/19/21 10:10 PM   Result Value Ref Range    Sodium 140 136 - 145 mmol/L    Potassium 3.8 3.5 - 5.1 mmol/L    Chloride 106 97 - 108 mmol/L    CO2 26 21 - 32 mmol/L    Anion gap 8 5 - 15 mmol/L    Glucose 99 65 - 100 mg/dL    BUN 14 6 - 20 MG/DL    Creatinine 1.20 0.70 - 1.30 MG/DL    BUN/Creatinine ratio 12 12 - 20      GFR est AA >60 >60 ml/min/1.73m2    GFR est non-AA >60 >60 ml/min/1.73m2    Calcium 8.9 8.5 - 10.1 MG/DL    Bilirubin, total 0.4 0.2 - 1.0 MG/DL    ALT (SGPT) 29 12 - 78 U/L    AST (SGOT) 17 15 - 37 U/L    Alk.  phosphatase 74 45 - 117 U/L    Protein, total 7.5 6.4 - 8.2 g/dL    Albumin 3.6 3.5 - 5.0 g/dL    Globulin 3.9 2.0 - 4.0 g/dL    A-G Ratio 0.9 (L) 1.1 - 2.2     ETHYL ALCOHOL    Collection Time: 09/19/21 10:10 PM   Result Value Ref Range    ALCOHOL(ETHYL),SERUM <10 <10 MG/DL   COVID-19 WITH INFLUENZA A/B    Collection Time: 09/19/21 10:14 PM   Result Value Ref Range    SARS-CoV-2 Not detected NOTD      Influenza A by PCR Not detected      Influenza B by PCR Not detected     DRUG SCREEN, URINE    Collection Time: 09/19/21 10:39 PM   Result Value Ref Range    AMPHETAMINES Negative NEG BARBITURATES Negative NEG      BENZODIAZEPINES Negative NEG      COCAINE Negative NEG      METHADONE Negative NEG      OPIATES Negative NEG      PCP(PHENCYCLIDINE) Negative NEG      THC (TH-CANNABINOL) Negative NEG      Drug screen comment (NOTE)    URINALYSIS W/ REFLEX CULTURE    Collection Time: 09/19/21 10:39 PM    Specimen: Miscellaneous sample; Urine    Urine specimen   Result Value Ref Range    Color YELLOW/STRAW      Appearance CLEAR CLEAR      Specific gravity 1.030 1.003 - 1.030      pH (UA) 6.0 5.0 - 8.0      Protein Negative NEG mg/dL    Glucose Negative NEG mg/dL    Ketone Negative NEG mg/dL    Blood Negative NEG      Urobilinogen 1.0 0.2 - 1.0 EU/dL    Nitrites Negative NEG      Leukocyte Esterase Negative NEG      WBC 0-4 0 - 4 /hpf    RBC 0-5 0 - 5 /hpf    Epithelial cells FEW FEW /lpf    Bacteria Negative NEG /hpf    UA:UC IF INDICATED CULTURE NOT INDICATED BY UA RESULT CNI      Mucus 2+ (A) NEG /lpf   BILIRUBIN, CONFIRM    Collection Time: 09/19/21 10:39 PM   Result Value Ref Range    Bilirubin UA, confirm Negative NEG         Radiologic Studies -   No orders to display     No results found. Medical Decision Making   I am the first provider for this patient. I reviewed the vital signs, available nursing notes, past medical history, past surgical history, family history and social history. Vital Signs-Reviewed the patient's vital signs. Patient Vitals for the past 12 hrs:   Temp Pulse Resp BP SpO2   09/19/21 2137 98 °F (36.7 °C) 86 17 (!) 127/90 97 %       Pulse Oximetry Analysis - 97% on ra      Records Reviewed: Nursing Notes and Old Medical Records    Provider Notes (Medical Decision Making):   Patient presents with suicidal ideation. DDx:  2/2 MDD, schizoaffective d/o, bipolar, drug induced. Will get UDS, alcohol level, basic labs. CTC to see. Given SI with plan, anticipate TDO    ED Course:   Initial assessment performed.  The patients presenting problems have been discussed, and they are in agreement with the care plan formulated and outlined with them. I have encouraged them to ask questions as they arise throughout their visit. Patient evaluated by Albertina Nuñez from [facility], plan for TDO    ED Course as of Sep 20 0133   Sun Sep 19, 2021   2318 Patient is medically cleared for psychiatric evaluation    [DADA]   Mon Sep 20, 2021   0117 Patient accepted by Dr. Reynaldo Lemus at [facility]      ED Course User Index  Ruben Bhagat MD       Procedures:  Procedures    Critical Care:  none    Disposition:  Transfer to inpatient psych    Diagnosis     Clinical Impression:   1. Suicidal ideation    2. Depression, unspecified depression type            Please note that this dictation was completed with [employer], the computer voice recognition software. Quite often unanticipated grammatical, syntax, homophones, and other interpretive errors are inadvertently transcribed by the computer software. Please disregard these errors.   Please excuse any errors that have escaped final proofreading

## 2021-09-20 NOTE — ROUTINE PROCESS
Shift change report given to Monika Medellin Rn, re: SBAR. Medications, and behavior.   Hugo Fall Risk Score (1)

## 2021-09-20 NOTE — BH NOTES
Behavioral Health Interdisciplinary Rounds     Patient Name: Caryle Beagle  Age: 38 y.o. Room/Bed:  236/01  Primary Diagnosis: Severe recurrent major depression without psychotic features ([facility] [address]   Admission Status: TDO     Readmission within 30 days: no  Power of  in place: no  Patient requires a blocked bed: yes          Reason for blocked bed: hx of morton    VTE Prophylaxis: Not indicated    Mobility needs/Fall risk: yes  Flu Vaccine : no   Nutritional Plan: no  Consults: no         Labs/Testing due today?: yes    Sleep hours: 0       Participation in Care/Groups:  Just admitted this am  Medication Compliant?: Yes  PRNS (last 24 hours): None    Restraints (last 24 hours):  no     CIWA (range last 24 hours):     COWS (range last 24 hours):      Alcohol screening (AUDIT) completed -   AUDIT Score: 0     If applicable, date SBIRT discussed in treatment team AND documented:   AUDIT Screen Score: AUDIT Score: 0      Document Brief Intervention (corresponds directly with the 5 A's, Ask, Advise, Assess, Assist, and Arrange): At- Risk Patients (Score 7-15 for women; 8-15 for men)  Discuss concern patient is drinking at unhealthy levels known to increase risk of alcohol-related health problems. Is Patient ready to commit to change? If No:   Encourage reflection   Discuss short term and long term health risks of consuming alcohol   Barriers to change   Reaffirm willingness to help / Educational materials provided  If Yes:   Set goal  [employer] provided    Harmful use or Dependence (Score 16 or greater)   Discuss short term and long term health risks of consuming alcohol   Recommendations   Negotiate drinking goal   Recommend addiction specialist/center   Arrange follow-up appointments.     Tobacco - patient is a smoker: Have You Used Tobacco in the Past 30 Days: No  Illegal Drugs use: Have You Used Any Illegal Substances Over the Past 12 Months: No    24 hour chart check complete: no     Patient goal(s) for today: has not set a goal at this time  Treatment team focus/goals: absence of falls  Progress note patient was admitted this am for SI, he is a TDO hearing will be held on 9/21    LOS:  0  Expected LOS:     Financial concerns/prescription coverage:  no  Family contact: no      Family requesting physician contact today:  no  Discharge plan: will determine  Access to weapons : no         Outpatient provider(s): unknown at this time  Patient's preferred phone number for follow up call :[phone]     Participating treatment team members: Dr. Brad Sesay * (assigned SW), Roscoe Cote

## 2021-09-20 NOTE — H&P
[facility] HISTORY AND PHYSICAL    Name:  Corrie Holcomb  MR#:  [id number]  :  1985  ACCOUNT #:  [de-identified]  ADMIT DATE:  2021      [facility] PSYCHIATRIC ADMISSION NOTE    HISTORY OF PRESENT ILLNESS:  The patient is a 78-year-old  male, who has a past psychiatric history of symptoms of depression and he was admitted on a TDO from [facility] emergency room due to concerns about suicidal thinking and gesturing. Specifically, he apparently had gotten into an argument with his wife while they were riding in the car, and she pulled over and he got out. She then drove off and he was either at or on a large bridge, and he reportedly was walking to the edge of it (which he denies), and/or at least enough people were concerned that the police were called. It was reported that they were talking to him and then he started to go back towards the edge of the bridge as if he might be considering jumping and they were able to bring him back and take him to the emergency room on [address]. He was a bit evasive in that setting, and trying to deny that he was having any suicidal ideation at all that anything had even really happened when clearly evidence suggested otherwise, and that is why a TDO was issued. It was noted that he has been essentially  from his wife off and on for a number of months, and this has created some ongoing stress and periods of distress and depression on his part. He had been hospitalized at [facility] psychiatrically  for 7 days, and put on Prozac, but he apparently did not continue to take it post discharge. He states he has tried multiple different antidepressants over the years and has never found any of them to be helpful, but he cannot really be more specific than that, and it is not clear if he took them for therapeutic length of time or not.     He does admit that he has been stressed and upset at times, but states it is basically directly due to the conflict that he and his wife are having. They continue to live together even though they are  and by his description, they each seemed to blame the other for being somewhat unfaithful. He states that he has reached out to former girlfriends and other women, but he only just talks to them on the phone and he has not had any physical contact with them. He tells me that his wife had \"cheated\" but it is not clear if that was recently or in the past.  Some information in the chart suggest that he might have been the one who cheated, but he states that his wife calls his texting with women \"cheating\". He seems to show a reasonably full affective range at this point, and clearly much of his dysphoria seemed to be situationally triggered, but he does admit that his sleep and appetite have not been good and that he has periods when he definitely feels dysphoric and anhedonic. He denies that he has any suicidal thoughts or even any feelings of hopelessness at this point, but I suspect that he has a lot of emotional volatility and impulsivity which could lead to episodes of more significant emotional dyscontrol. There was no history to suggest any clear evidence of bipolar disorder, such as any symptoms of zacarias or hypomania, and he denied any symptoms of psychosis ever. He also denies any substance abuse issues stating that he really does not use any drugs or drink alcohol and that has been consistently reported in the records. His urine drug screen and alcohol level are both negative. PAST PSYCHIATRIC HISTORY:  He was hospitalized twice previously, once at [facility] in 2015 and once at [facility] on 06/20/2021 for similar symptoms as today.   He has taken Prozac and Remeron that we know of and he states he has tried multiple other antidepressants, but he was fairly drowsy and did not put forth much effort to tell me what are the meds he can recall taking. PAST MEDICAL HISTORY:  History of MRSA positive. MEDICATIONS ON ADMISSION:  None. He most recently was taking 20 mg of Prozac and 50 mg of trazodone. ALLERGIES:  MIRTAZAPINE. FAMILY HISTORY:  There is reportedly history of some depression and anxiety in several extended family members. SOCIAL HISTORY:  He is  but still living together with his wife. He has five children total, two with his current marriage and three other children. He graduated from high school and is a  but it is unclear how consistently he has been working. He lives in [place] with his wife at this point. He does smoke. MENTAL STATUS EXAM:  The patient was noted to be a casually dressed, adequately groomed 47-year-old who appeared his stated age. He is very sleepy as he had just gotten onto the unit a few hours ago early this morning. However, he was pleasant and cooperative with the interview and attempted to answer questions, although he would occasionally start to doze off. He tended to deny/minimize most of the reported symptoms and difficulties, but he does admit that he has been distressed and somewhat dysphoric off and on due to issues involved within the marital conflict as noted above. He denies any current suicidal thoughts whatsoever and states he had no suicidal thoughts or plans when he was on the bridge, and that what was reported was an exaggeration of his behavior. He also denies any symptoms of zacarias or hypomania as well as any issues with severe anxiety at this point. His judgment and insight are obviously somewhat impulsive and poor based on recent events. There was no evidence of any psychosis, such as hallucinations or delusional thinking. Cognitively, his fund of knowledge appeared to be average to slightly below average but with no overt deficits evident. DIAGNOSTIC IMPRESSION:  AXIS I:  1.   Major Depression, recurrent, moderate to severe (F33.2). 2.  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 AXIS II:  Deferred. AXIS III:  History of Methicillin-resistant Staphylococcus aureus positive. AXIS IV:  Stressors are moderate to severe (marital conflict, unstable living situation). AXIS V:  GAF currently is 50. PLAN:  1. We will admit the patient for further supportive treatment, close observation, increased structure, and involvement within the groups and milieu. 2.  He was agreeable to starting a trial on Wellbutrin SR taking 150 mg daily. I discussed potential side effects with him as well. It is unclear if he will continue to take the medicine post discharge given his history, however. 3.  We will also use trazodone and Vistaril on a p.r.n. basis for sleep and anxiety respectively. 4.  Estimated length of stay will be anywhere from 1-5 more days depending upon whether he is committed at the [facility] tomorrow, or not. Followup likely would be with the [address]. I certify that this patients psychiatric hospital admission is medically necessary for treatment which could reasonably be expected to improve their condition and/or for diagnostic study. The inpatient psychiatric services are provided while the patient is under the care of a physician and are included in the individualized plan of care.      Amari Castrejon MD      [id number]  D:  09/20/2021 10:53  T:  09/20/2021 11:17  JOB #:  [id number]  CC:  [address] [place] [address]

## 2021-09-20 NOTE — BH NOTES
Shift change report given to Carrington Case re: SBAR, medications, and behavior.   Hugo fall risk score: 1

## 2021-09-20 NOTE — GROUP NOTE
[facility]  GROUP DOCUMENTATION INDIVIDUAL                                                                          Group Therapy Note    Date: 9/20/2021      Pt did not attend group.   We will continue to encourage pt to attend in the future

## 2021-09-20 NOTE — BH NOTES
Affect blunted, mood flat. Pt was cooperative with admission process but was very flat with responses. Pt stated that he denies SI/HI/AVH/Pain. Pt also denies any use of tobacco, although it is listed that he is an everyday smoker. Pt denies that he has ever been suicidal.  Pt also stated that he has never had MRSA, although it is noted in his medical chart. Pt was placed in a private room until lab results come back. Pt was given a soda and offered a snack. Pt stated that he had a COVID-19 vaccine which he believes is what caused his increased depression. Pt is in no apparent distress at this time and made no delusional statements. Pt rested in his bed with his eyes closed for 0 hours.

## 2021-09-20 NOTE — BH NOTES
Patient did not attend nor participate in community meeting this morning with his peers. Patient was sleep at time of meeting.

## 2021-09-20 NOTE — PROGRESS NOTES
Problem: Falls - Risk of  Goal: *Absence of Falls  Description: Document [place] Fall Risk and appropriate interventions in the flowsheet.   Outcome: Progressing Towards Goal  Note: Fall Risk Interventions:

## 2021-09-21 VITALS
TEMPERATURE: 96.4 F | SYSTOLIC BLOOD PRESSURE: 102 MMHG | DIASTOLIC BLOOD PRESSURE: 62 MMHG | HEIGHT: 73 IN | OXYGEN SATURATION: 97 % | BODY MASS INDEX: 27.57 KG/M2 | WEIGHT: 208 LBS | RESPIRATION RATE: 17 BRPM | HEART RATE: 62 BPM

## 2021-09-21 LAB
BACTERIA SPEC CULT: NORMAL
BACTERIA SPEC CULT: NORMAL
SERVICE CMNT-IMP: NORMAL

## 2021-09-21 PROCEDURE — 99239 HOSP IP/OBS DSCHRG MGMT >30: CPT | Performed by: PSYCHIATRY & NEUROLOGY

## 2021-09-21 PROCEDURE — 74011250637 HC RX REV CODE- 250/637: Performed by: PSYCHIATRY & NEUROLOGY

## 2021-09-21 RX ORDER — BUPROPION HYDROCHLORIDE 300 MG/1
300 TABLET ORAL DAILY
Qty: 30 TABLET | Refills: 1 | Status: SHIPPED | OUTPATIENT
Start: 2021-09-21 | End: 2022-02-11

## 2021-09-21 RX ADMIN — BUPROPION HYDROCHLORIDE 150 MG: 150 TABLET, EXTENDED RELEASE ORAL at 09:03

## 2021-09-21 NOTE — PROGRESS NOTES
Problem: Sleep Disturbance  Goal: *LTG: Restoration of normal sleep pattern  Outcome: Resolved/Met  Goal: *LTG: Feel refreshed and energetic during wakeful hours  Outcome: Resolved/Met  Goal: *LTG: Termination of anxiety-producing dreams that cause awakening  Outcome: Resolved/Met  Goal: *LTG: End abrupt awakening in terror and return to peaceful, restful sleep pattern  Outcome: Resolved/Met  Goal: *LTG: Restore restful sleep with reduction of sleepwalking incidents  Outcome: Resolved/Met  Goal: *STG: Keep a journal of daily stressors & sleep pattern  Outcome: Resolved/Met  Goal: *STG: Share history of substance abuse or medication use  Outcome: Resolved/Met  Goal: *STG: Verbalize depressive feelings and share possible causes  Outcome: Resolved/Met  Goal: *STG: Discuss experiences of emotional traumas that continue to disturb sleep  Outcome: Resolved/Met  Goal: *STG: Follow sleep induction schedule of events  Outcome: Resolved/Met  Goal: *STG: Practice deep-muscle relaxation exercises  Outcome: Resolved/Met  Goal: *STG: Describe disturbing dreams by keeping a dream journal  Outcome: Resolved/Met  Goal: *STG: Take medications as prescribed to assess effect on sleep  Outcome: Resolved/Met  Goal: *STG: Share childhood traumatic experiences associated with sleep experience  Outcome: Resolved/Met  Goal: Sleep Disturbance Interventions  Outcome: Resolved/Met     Problem: Depressed Mood (Adult/Pediatric)  Goal: *STG: Participates in treatment plan  Outcome: Resolved/Met  Goal: *STG: Participates in 1:1 therapy sessions  Outcome: Resolved/Met  Goal: *STG: Verbalizes anger, guilt, and other feelings in a constructive manor  Outcome: Resolved/Met  Goal: *STG: Demonstrates reduction in symptoms and increase in insight into coping skills/future focused  Outcome: Resolved/Met  Goal: *STG: Complies with medication therapy  Outcome: Resolved/Met  Goal: *LTG: Returns to previous level of functioning and participates with after care plan  Outcome: Resolved/Met  Goal: *LTG: Understands illness and can identify signs of relapse  Outcome: Resolved/Met  Goal: Interventions  Outcome: Resolved/Met     Problem: Patient Education: Go to Patient Education Activity  Goal: Patient/Family Education  Outcome: Resolved/Met     Problem: Anxiety  Goal: *Alleviation of anxiety  Outcome: Resolved/Met  Goal: *Alleviation of anxiety (Palliative Care)  Outcome: Resolved/Met     Problem: Patient Education: Go to Patient Education Activity  Goal: Patient/Family Education  Outcome: Resolved/Met     Problem: Falls - Risk of  Goal: *Absence of Falls  Description: Document Hugo Fall Risk and appropriate interventions in the flowsheet.   Outcome: Resolved/Met     Problem: Patient Education: Go to Patient Education Activity  Goal: Patient/Family Education  Outcome: Resolved/Met     Problem: Anger Management  Goal: *STG: Participates in treatment plan  Outcome: Resolved/Met  Goal: *STG: Remains safe in hospital  Outcome: Resolved/Met  Goal: *STG: Seeks staff when feelings of self harm or harm towards others arise  Outcome: Resolved/Met  Goal: *STG: Verbalizes area in need of boundary recognition and limit settings  Outcome: Resolved/Met  Goal: *STG: Accept constructive criticism without injury or isolation  Outcome: Resolved/Met  Goal: *STG: Participate with others in group activities  Outcome: Resolved/Met  Goal: *STG: Develop safety plan for times when triggered in stressful situations  Outcome: Resolved/Met  Goal: *STG: Express feelings towards relationships in realistic terms  Outcome: Resolved/Met  Goal: *STG: Verbalizes increased motivation  Outcome: Resolved/Met  Goal: *STG/LTG: Complies with medication therapy  Outcome: Resolved/Met  Goal: *STG/LTG: Patient will verbalize recognition of unhealthy coping skills and verbalize alternative skills  Outcome: Resolved/Met  Goal: Interventions  Outcome: Resolved/Met     Problem: Patient Education: Go to Patient Education Activity  Goal: Patient/Family Education  Outcome: Resolved/Met     Problem: Risk of Harm to Self or Others  Goal: Patient/Family Education  Outcome: Resolved/Met  Goal: *LTG: Denial of suicidal ideation or intent for at least 2 days  Outcome: Resolved/Met  Goal: *LTG: Expression of positive future orientation that includes meaningful activity  Outcome: Resolved/Met  Goal: *LTG: Demonstrate ability to manage frustration or anger  Description: Demonstrate ability to manage frustration or anger without aggressive behavior for 3 consecutive days in therapeutic milieu. Outcome: Resolved/Met  Goal: *LTG: Develop a discharge plan  Description: Develop a discharge plan that includes a support system and ongoing outpatient therapy. Outcome: Resolved/Met  Goal: STG: Patient will limit number of statements of suicidal ideation or intent per day  Description: Patient will limit number of statements of suicidal ideation or intent per day. Indicate number of statements/day. Outcome: Resolved/Met  Goal: *STG: Patient will express feelings of depression, suicide, or self-harm without acting out  Description: Patient will express feelings of depression, suicide, or self-harm without acting out. Comment on how this will be achieved. Outcome: Resolved/Met  Goal: *STG: Patient will identify 2 feelings or symptoms that might indicate a crisis is beginning to develop  Outcome: Resolved/Met  Goal: *STG: Patient will identify 2 alternative ways to cope with suicidal or self-harm feelings  Outcome: Resolved/Met  Goal: *STG: Patient will identify 2 support persons to contact after discharge  Outcome: Resolved/Met  Goal: *STG: Patient will complete a Crisis/Recovery Plan  Description: Patient will complete a Crisis/Recovery Plan to identify when crises are developing and to identify resources for managing crises.   Outcome: Resolved/Met  Goal: *STG: Patient will develop a list of support people in his life  Description: Patient will develop a list of support people in his/her life whom he/she will call when feeling to hurt self or others may return.   Outcome: Resolved/Met  Goal: *STG: Patient will agree to attend scheduled follow-up therapy and support programs after discharge  Outcome: Resolved/Met  Goal: Risk of harm to self or others interventions  Outcome: Resolved/Met

## 2021-09-21 NOTE — DISCHARGE INSTRUCTIONS
Patient Education        Learning About Depression Screening  What is depression screening? Depression screening is a way to see if you have depression symptoms. It may be done by a doctor or counselor. It's often part of a routine checkup. That's because your mental health is just as important as your physical health. Depression is a medical illness that affects how you feel, think, and act. BEHAVIORAL HEALTH NURSING DISCHARGE NOTE      The following personal items collected during your admission are returned to you:   Dental Appliance:    Vision: Visual Aid: None  Hearing Aid:    Jewelry:    Clothing: Clothing: Footwear, Hat, Pants, Shirt, Socks, Undergarments, With patient  Other Valuables:    Valuables sent to safe: Personal Items Sent to Safe: 1black lighter,1black cell phone,$3.07 cash      PATIENT INSTRUCTIONS:    What to do at [employer] may resume normal activities. Avoid making any critical decisions for at least 24-hours. Recommended diet: Regular Diet. National Suicide Prevention Line: 93289796337     COPE Hotline: 61669602706     Smoking Cessation Hotline: 22776960558     If you have problems relating to your recovery, call your physician. The discharge information has been reviewed with the patient. The patient verbalized understanding. Depression is very common. It affects men and women of all ages. Many things can trigger depression. Some people become depressed after they have a stroke or find out they have a major illness like cancer or heart disease. The death of a loved one, a breakup, or changes in the natural brain chemicals may lead to depression. It can run in families. Most experts believe that a combination of inherited genes and stressful life events can cause it. What happens during screening? You may be asked to fill out a form about your depression symptoms. You and the doctor will discuss your answers.  The doctor may ask you more questions to learn more about how you think, act, and feel. What happens after screening? If you have signs of depression, your doctor will talk to you about your options. Doctors usually treat depression with medicines or counseling. Often, combining the two works best. Many people don't get help because they think that they'll get over the depression on their own. But people with depression may not get better unless they get treatment. Many people feel embarrassed or ashamed about having depression. But it isn't a sign of personal weakness. It's not a character flaw. A person who is depressed is not \"crazy. \" Depression is caused by changes in the brain. A serious symptom of depression is thinking about death or suicide. If you or someone you care about talks about this or about feeling hopeless, get help right away. It's important to know that depression can be treated. The first step toward feeling better is often just seeing that the problem exists. Where can you learn more? Go to http://www.gray.com/  Enter T185 in the search box to learn more about \"Learning About Depression Screening. \"  Current as of: June 16, 2021               Content Version: 13.0  © 3216-3465 [employer]. Care instructions adapted under license by [employer] (which disclaims liability or warranty for this information). If you have questions about a medical condition or this instruction, always ask your healthcare professional. [place] [address] any warranty or liability for your use of this information. Patient Education        Adjustment Disorder: Care Instructions  Your Care Instructions     Adjustment disorder means that you have emotional or behavioral problems because of stress. But your response to the stress is far more severe than a normal response. It is severe enough to affect your work or social life and may cause depression and physical pains and problems. Events that may cause this response can include a divorce, money problems, or starting school or a new job. It might be anything that causes some stress. This disorder is most often a short-term problem. It happens within 3 months of the stressful event or change. If the response lasts longer than 6 months after the event ends, you may have a more serious disorder. Follow-up care is a key part of your treatment and safety. Be sure to make and go to all appointments, and call your doctor if you are having problems. It's also a good idea to know your test results and keep a list of the medicines you take. How can you care for yourself at home? · Go to all counseling sessions. Do not skip any because you are feeling better. · If your doctor prescribed medicines, take them exactly as prescribed. Call your doctor if you think you are having a problem with your medicine. You will get more details on the specific medicines your doctor prescribes. · Discuss the causes of your stress with a good friend or family member. Or you can join a support group for people with similar problems. Talking to others sometimes relieves stress. · Get at least 30 minutes of exercise on most days of the week. Walking is a good choice. You also may want to do other activities, such as running, swimming, cycling, or playing tennis or team sports. Relaxation techniques  Do relaxation exercises 10 to 20 minutes a day. You can play soothing, relaxing music while you do them, if you wish. · Tell others in your house that you are going to do your relaxation exercises. Ask them not to disturb you. · Find a comfortable, quiet place. · Lie down on your back, or sit with your back straight. · Focus on your breathing. Make it slow and steady. · Breathe in through your nose. Breathe out through either your nose or mouth. · Breathe deeply, filling up the area between your navel and your rib cage.  Breathe so that your belly goes up and down.  · Do not hold your breath. · Breathe like this for 5 to 10 minutes. Notice the feeling of calmness throughout your whole body. As you continue to breathe slowly and deeply, relax by doing these next steps for another 5 to 10 minutes:  · Tighten and relax each muscle group in your body. Start at your toes, and work your way up to your head. · Imagine your muscle groups relaxing and getting heavy. · Empty your mind of all thoughts. · Let yourself relax more and more deeply. · Be aware of the state of calmness that surrounds you. · When your relaxation time is over, you can bring yourself back to alertness by moving your fingers and toes. Then move your hands and feet. And then move your entire body. Sometimes people fall asleep during relaxation. But they most often wake up soon. · Always give yourself time to return to full alertness before you drive a car. Wait to do anything that might cause an accident if you are not fully alert. Never play a relaxation tape while you drive a car. When should you call for help? Call 911 anytime you think you may need emergency care. For example, call if:    · You feel you cannot stop from hurting yourself or someone else. Keep the numbers for these national suicide hotlines: 9-075-067-TALK (9-645.854.8458) and 5-294-TIEVWRV (8-995.706.3025). If you or someone you know talks about suicide or feeling hopeless, get help right away. Watch closely for changes in your health, and be sure to contact your doctor if:    · You have new anxiety, or your anxiety gets worse.     · You have been feeling sad, depressed, or hopeless or have lost interest in things that you usually enjoy.     · You do not get better as expected. Where can you learn more? Go to http://www.gray.com/  Enter R087 in the search box to learn more about \"Adjustment Disorder: Care Instructions. \"  Current as of: June 16, 2021               Content Version: 13.0  © 9017-2489 [employer]. Care instructions adapted under license by [employer] (which disclaims liability or warranty for this information). If you have questions about a medical condition or this instruction, always ask your healthcare professional. [place] [address] any warranty or liability for your use of this information. Patient Education        Learning About Mood Disorders  What are mood disorders? Mood disorders are medical problems that affect how you feel. They can impact your moods, thoughts, and actions. Mood disorders include:  · Depression. This causes you to feel sad or hopeless for much of the time. · Bipolar disorder. This causes extreme mood changes from manic episodes of very high energy to extreme lows of depression. · Seasonal affective disorder (SAD). This is a type of depression that affects you during the same season each year. Most often people experience SAD during the fall and winter months when days are shorter and there is less light. What are the symptoms? Depression  You may:  · Feel sad or hopeless nearly every day. · Lose interest in or not get pleasure from most daily activities. You feel this way nearly every day. · Have low energy, changes in your appetite, or changes in how well you sleep. · Have trouble concentrating. · Think about death and suicide. Keep the numbers for these national suicide hotlines: 9-525-466-TALK (1-858.804.1930) and 7-341-HUEVMPR (5-766.424.4206). If you or someone you know talks about suicide or feeling hopeless, get help right away. Bipolar disorder  Symptoms depend on your mood swings. You may:  · Feel very happy, energetic, or on edge. · Feel like you need very little sleep. · Feel overly self-confident. · Do impulsive things, such as spending a lot of money. · Feel sad or hopeless. · Have racing thoughts or trouble thinking and making decisions.   · Lose interest in things you have enjoyed in the past.  · Think about death and suicide. Keep the numbers for these national suicide hotlines: 6-910-950-TALK (6-901.694.5104) and 8-706-ZJRKZAY (8-454.669.2672). If you or someone you know talks about suicide or feeling hopeless, get help right away. Seasonal affective disorder (SAD)  Symptoms come and go at about the same time each year. For most people with SAD, symptoms come during the winter when there is less daylight. You may:  · Feel sad, grumpy, espinosa, or anxious. · Lose interest in your usual activities. · Eat more and crave carbohydrates, such as bread and pasta. · Gain weight. · Sleep more and feel drowsy during the daytime. How are mood disorders treated? Mood disorders can be treated with medicines or counseling, or a combination of both. Medicines for depression and SAD may include antidepressants. Medicines for bipolar disorder may include:  · Mood stabilizers. · Antipsychotics. · Benzodiazepines. Counseling may involve cognitive-behavioral therapy. It teaches you how to change the ways you think and behave. This can help you stop thinking bad thoughts about yourself and your life. Light therapy is the main treatment for SAD. This therapy uses a special kind of lamp. You let the lamp shine on you at certain times, usually in the morning. This may help your symptoms during the months when there is less sunlight. Healthy lifestyle  Healthy lifestyle changes may help you feel better. · Be active often. You might try walking or strength training. · Eat a healthy diet. Include fruits, vegetables, lean proteins, and whole grains in your diet each day. · Keep a regular sleep schedule. Try for 8 hours of sleep a night. · Find ways to manage stress, such as relaxation exercises. · Avoid alcohol and illegal drugs. Follow-up care is a key part of your treatment and safety. Be sure to make and go to all appointments, and call your doctor if you are having problems.  It's also a good idea to know your test results and keep a list of the medicines you take. Where can you learn more? Go to http://www.gray.com/  Enter Z126 in the search box to learn more about \"Learning About Mood Disorders. \"  Current as of: June 16, 2021               Content Version: 13.0  © 0159-5093 [employer]. Care instructions adapted under license by [employer] (which disclaims liability or warranty for this information). If you have questions about a medical condition or this instruction, always ask your healthcare professional. [address] any warranty or liability for your use of this information.

## 2021-09-21 NOTE — PROGRESS NOTES
Problem: Patient Education: Go to Patient Education Activity  Goal: Patient/Family Education  Outcome: Progressing Towards Goal     Problem: Depressed Mood (Adult/Pediatric)  Goal: *STG: Participates in treatment plan  Outcome: Progressing Towards Goal  Goal: *STG: Participates in 1:1 therapy sessions  Outcome: Progressing Towards Goal  Goal: *STG: Attends activities and groups  Outcome: Resolved/Met  Goal: *STG: Remains safe in hospital  Outcome: Resolved/Met     Problem: Patient Education: Go to Patient Education Activity  Goal: Patient/Family Education  Outcome: Progressing Towards Goal

## 2021-09-21 NOTE — BH NOTES
Pt A&Ox3 with no s/sx of acute distress noted at this time. Pt denies pain, anxiety, and depression. Pt denies SI/HI/AVH. Pt took all meds and ate all breakfast in the dayroom with peers. Pt to be discharged to home with wife this afternoon, he appears hopeful. Pt packed all belongings and to sign all ppwk and receive Rx's from MD. Education to be given on medication and follow-up. Safety plan completed and signed. Pt able to make needs known and voices no further concerns at this time. Will continue to monitor until discharge.

## 2021-09-21 NOTE — BH NOTES
Shift change report given to Bobby PoolRN re: SBAR, medications, and behavior.   Hugo fall risk score 1

## 2021-09-21 NOTE — BH NOTES
Patient had his TDO hearing this morning, he did participate and was released at his hearing. SW contacted [place] [address] to get the information. He can follow up at the [place] office on Wednesday's from 9-1. He was given all the information for follow up. Patient was involved and agreeable in discharge plan. He was given a copy of his [address] [place] [address] transition of care to take with him.

## 2021-09-21 NOTE — PROGRESS NOTES
09/20/21 [address] meeting   Attendance Attended   Number of participants 3  (one was dicharged during the [place]. [address])   Time in 80   Time out 2030   Total Time 45   MTP problem Other (comment)  (trouble with home life)   Interventions/techniques Supported   Follows directions Followed directions   Interactions Interacted appropriately   Behavior/appearance Cooperative   Suicide Risk Factors Kathi Guido has no thoughts of hurting himself nor anyone else   Suicide Protective Factors Denies intent   Goals Achieved Able to engage in interactions; Able to listen to others     Kathi Guido was out for the [employer] tonight. He stated that his day was okay, ate good and hopefully will sleep good tonight. He stated that he is having no anxiety nor depression. His good part of the day was talking with wife and bad thing was ended up here. He says he has no thoughts of hurting himself nor annyone else and is having some knee pain of a 2. Nurse notified.     Christopher Raygoza CNA

## 2021-09-21 NOTE — PROGRESS NOTES
09/21/21 [address] meeting   Attendance Attended   Number of participants 3   Time in 1000   Time out 1030   Total Time 30   Follows directions Followed directions   Interactions Interacted appropriately   Mental Status Calm; Happy   Behavior/appearance Cooperative   Long Term Risk of Suicide Low   Immediate Risk for Suicide Low   Suicide Protective Factors Future oriented/reason to live   Risk of Violence Low   Risk of Trauma Low   Goals Achieved Able to receive feedback   Patient appetite was good, no physical, pain, no suicidal thoughts. Patient stated that he has no depression or anxiety.

## 2021-09-21 NOTE — PROGRESS NOTES
Problem: Patient Education: Go to Patient Education Activity  Goal: Patient/Family Education  9/21/2021 1015 by Sugey Yanes RN  Outcome: Resolved/Met  9/21/2021 1014 by Sugey Yanes RN  Outcome: Resolved/Not Met     Problem: Sleep Disturbance  Goal: *LTG: Restoration of normal sleep pattern  Outcome: Resolved/Met  Goal: *LTG: Feel refreshed and energetic during wakeful hours  Outcome: Resolved/Met  Goal: *LTG: Termination of anxiety-producing dreams that cause awakening  Outcome: Resolved/Met  Goal: *LTG: End abrupt awakening in terror and return to peaceful, restful sleep pattern  Outcome: Resolved/Met  Goal: *LTG: Restore restful sleep with reduction of sleepwalking incidents  Outcome: Resolved/Met  Goal: *STG: Keep a journal of daily stressors & sleep pattern  Outcome: Resolved/Met  Goal: *STG: Share history of substance abuse or medication use  Outcome: Resolved/Met  Goal: *STG: Verbalize depressive feelings and share possible causes  Outcome: Resolved/Met  Goal: *STG: Discuss experiences of emotional traumas that continue to disturb sleep  Outcome: Resolved/Met  Goal: *STG: Follow sleep induction schedule of events  Outcome: Resolved/Met  Goal: *STG: Practice deep-muscle relaxation exercises  Outcome: Resolved/Met  Goal: *STG: Describe disturbing dreams by keeping a dream journal  Outcome: Resolved/Met  Goal: *STG: Take medications as prescribed to assess effect on sleep  Outcome: Resolved/Met  Goal: *STG: Share childhood traumatic experiences associated with sleep experience  Outcome: Resolved/Met  Goal: Sleep Disturbance Interventions  Outcome: Resolved/Met     Problem: Depressed Mood (Adult/Pediatric)  Goal: *STG: Participates in treatment plan  Outcome: Resolved/Met  Goal: *STG: Participates in 1:1 therapy sessions  Outcome: Resolved/Met  Goal: *STG: Verbalizes anger, guilt, and other feelings in a constructive manor  Outcome: Resolved/Met  Goal: *STG: Demonstrates reduction in symptoms and increase in insight into coping skills/future focused  Outcome: Resolved/Met  Goal: *STG: Complies with medication therapy  Outcome: Resolved/Met  Goal: *LTG: Returns to previous level of functioning and participates with after care plan  Outcome: Resolved/Met  Goal: *LTG: Understands illness and can identify signs of relapse  Outcome: Resolved/Met  Goal: Interventions  Outcome: Resolved/Met     Problem: Patient Education: Go to Patient Education Activity  Goal: Patient/Family Education  Outcome: Resolved/Met     Problem: Anxiety  Goal: *Alleviation of anxiety  Outcome: Resolved/Met  Goal: *Alleviation of anxiety (Palliative Care)  Outcome: Resolved/Met     Problem: Patient Education: Go to Patient Education Activity  Goal: Patient/Family Education  Outcome: Resolved/Met     Problem: Falls - Risk of  Goal: *Absence of Falls  Description: Document Hugo Fall Risk and appropriate interventions in the flowsheet.   Outcome: Resolved/Met     Problem: Patient Education: Go to Patient Education Activity  Goal: Patient/Family Education  Outcome: Resolved/Met     Problem: Anger Management  Goal: *STG: Participates in treatment plan  Outcome: Resolved/Met  Goal: *STG: Remains safe in hospital  Outcome: Resolved/Met  Goal: *STG: Seeks staff when feelings of self harm or harm towards others arise  Outcome: Resolved/Met  Goal: *STG: Verbalizes area in need of boundary recognition and limit settings  Outcome: Resolved/Met  Goal: *STG: Accept constructive criticism without injury or isolation  Outcome: Resolved/Met  Goal: *STG: Participate with others in group activities  Outcome: Resolved/Met  Goal: *STG: Develop safety plan for times when triggered in stressful situations  Outcome: Resolved/Met  Goal: *STG: Express feelings towards relationships in realistic terms  Outcome: Resolved/Met  Goal: *STG: Verbalizes increased motivation  Outcome: Resolved/Met  Goal: *STG/LTG: Complies with medication therapy  Outcome: Resolved/Met  Goal: *STG/LTG: Patient will verbalize recognition of unhealthy coping skills and verbalize alternative skills  Outcome: Resolved/Met  Goal: Interventions  Outcome: Resolved/Met     Problem: Patient Education: Go to Patient Education Activity  Goal: Patient/Family Education  Outcome: Resolved/Met     Problem: Risk of Harm to Self or Others  Goal: Patient/Family Education  Outcome: Resolved/Met  Goal: *LTG: Denial of suicidal ideation or intent for at least 2 days  Outcome: Resolved/Met  Goal: *LTG: Expression of positive future orientation that includes meaningful activity  Outcome: Resolved/Met  Goal: *LTG: Demonstrate ability to manage frustration or anger  Description: Demonstrate ability to manage frustration or anger without aggressive behavior for 3 consecutive days in therapeutic milieu. Outcome: Resolved/Met  Goal: *LTG: Develop a discharge plan  Description: Develop a discharge plan that includes a support system and ongoing outpatient therapy. Outcome: Resolved/Met  Goal: STG: Patient will limit number of statements of suicidal ideation or intent per day  Description: Patient will limit number of statements of suicidal ideation or intent per day. Indicate number of statements/day. Outcome: Resolved/Met  Goal: *STG: Patient will express feelings of depression, suicide, or self-harm without acting out  Description: Patient will express feelings of depression, suicide, or self-harm without acting out. Comment on how this will be achieved.   Outcome: Resolved/Met  Goal: *STG: Patient will identify 2 feelings or symptoms that might indicate a crisis is beginning to develop  Outcome: Resolved/Met  Goal: *STG: Patient will identify 2 alternative ways to cope with suicidal or self-harm feelings  Outcome: Resolved/Met  Goal: *STG: Patient will identify 2 support persons to contact after discharge  Outcome: Resolved/Met  Goal: *STG: Patient will complete a Crisis/Recovery Plan  Description: Patient will complete a Crisis/Recovery Plan to identify when crises are developing and to identify resources for managing crises. Outcome: Resolved/Met  Goal: *STG: Patient will develop a list of support people in his life  Description: Patient will develop a list of support people in his/her life whom he/she will call when feeling to hurt self or others may return.   Outcome: Resolved/Met  Goal: *STG: Patient will agree to attend scheduled follow-up therapy and support programs after discharge  Outcome: Resolved/Met  Goal: Risk of harm to self or others interventions  Outcome: Resolved/Met Consent (Temporal Branch)/Introductory Paragraph: The rationale for Mohs was explained to the patient and consent was obtained. The risks, benefits and alternatives to therapy were discussed in detail. Specifically, the risks of damage to the temporal branch of the facial nerve, infection, scarring, bleeding, prolonged wound healing, incomplete removal, allergy to anesthesia, and recurrence were addressed. Prior to the procedure, the treatment site was clearly identified and confirmed by the patient. All components of Universal Protocol/PAUSE Rule completed.

## 2021-09-21 NOTE — DISCHARGE SUMMARY
[facility] DISCHARGE    Name:  Lety Dupree  MR#:  [id number]  :  1985  ACCOUNT #:  [de-identified]  ADMIT DATE:  2021  DISCHARGE DATE:  2021    [facility] DISCHARGE SUMMARY    NOTE:  Greater than 35 minutes was spent in preparation of this discharge. DISCHARGE DIAGNOSES:  AXIS I:  1. Adjustment disorder with disturbance of mood and conduct (F 43.25). 2.  History of Major Depression, possibly with moderate recurrence (F 33.1). AXIS II:  Deferred. AXIS III:  History of Methicillin-resistant Staphylococcus aureus positivity. AXIS IV:  Stressors are moderate to severe (marital conflict and unstable living situation). AXIS V:  Global Assessment of Functioning at discharge is 75. DISCHARGE MEDICATION:  Wellbutrin  mg daily (new) - - #30 pills with one refill. DISPOSITION/FOLLOW-UP PLANS:  The patient is being discharged later today in stable condition on 2021 after being released at the time of the commitment hearing this morning. He is being referred to the [address] for outpatient treatment, and he is familiar with that agency although he has not been a patient previously. It was also recommended that his wife gets some therapy and that they have strongly considered couples therapy as well, something he is aware of. HOSPITAL COURSE:  As noted in the admission note, the patient is a 58-year-old  male who has a past psychiatric history of some episodes of depression, and he was admitted on a TDO from the [facility] emergency room due to concerns about suicidal thinking and gesturing. He had got into an argument with his wife while they were riding in the car, and she pulled over either on or near a bridge, and he got out. She then drove off. He must have done some gesturing about looking as though he might consider jumping off the bridge as people call the police.   When they intervened, they did report that he at one point seemed to move as if he was going towards the edge of the bridge although he never made any aggressive acts to try to jump, etc.  They brought him to the emergency room where he tended to minimize everything that had happened, and a TDO was issued. Additionally, the TDO was issued likely because of the unstable, chaotic living situation he is in. He and his wife are , with each one accusing the other of cheating or reaching out to other people, but they apparently continue to live together, presumably \"working on the marriage. \"  In any event, he seems to have some insight into this and states that it really is a somewhat chaotic situation at times, but in the same breath, he notes that they do have a lot of positive aspects to their relationship. He states that he realizes he needs to get some ongoing help and that some counseling and marital therapy would be ideal, and he also states that he feels as though his wife needs that but it is unclear if she is committed to that or not. In any even, they did have a conversation on the phone, and she stated that she was essentially \"sorry\" for driving off on him and not thinking that things would have played out like they did, and both of them seemed to be very comfortable with him returning home at this point. As expected, he was released at the time of the commitment hearing on 09/21/2021 and able to be discharged. He seemed to show a full and bright affect, with no evidence of depression or dysphoria at all. He was started on some Wellbutrin SR at 150 mg daily and he took two doses with the plan to increase to 300 mg per day starting tomorrow. The importance of staying compliant with the medication to see its full potential benefits, along with the importance of followup care at the [facility] were strongly reinforced with him.   He also had no medical or physical issues and was felt to be medically stable for discharge when released. LABORATORY DATA:  His laboratory tests in the emergency room were entirely within normal limits.   His urine drug screen was negative and alcohol level was 0.      Lizy Patterson MD      [id number]  D:  09/21/2021 9:52  T:  09/21/2021 12:58  JOB #:  [id number]  CC:  [address] [place] [address]

## 2021-09-21 NOTE — SUICIDE SAFETY PLAN
SAFETY PLAN    A suicide Safety Plan is a document that supports someone when they are having thoughts of suicide. Warning Signs that indicate a suicidal crisis may be developing: What (situations, thoughts, feelings, body sensations, behaviors, etc.) do you experience that lets you know you are beginning to think about suicide? 1. Over thinking  2. No sleep  3. argue    Internal Coping Strategies:  What things can I do (relaxation techniques, hobbies, physical activities, etc.) to take my mind off my problems without contacting another person? 1. Going for a walk  2.video games   3. drawing    People and social settings that provide distraction: Who can I call or where can I go to distract me? 1. Name:   Phone:   2. Name:   Phone:    3. Place:             4. Place:     People whom I can ask for help: Who can I call when I need help - for example, friends, family, clergy, someone else? 1. Name: markie                Phone:   2. Name: paulino  Phone:   3. Name: Carlito Graff  Phone:     Professionals or [employer] agencies I can contact during a crisis: Who can I call for help - for example, my doctor, my psychiatrist, my psychologist, a mental health provider, a suicide hotline? 1. Clinician Name:    Phone:       Clinician Pager or Emergency Contact #:     2. Clinician Name:    Phone:       Clinician Pager or Emergency Contact #:     3. Suicide Prevention Lifeline: 5-944-755-TALK (9263)    4. [address] [place] [address] Emergency Services -  [facility]: [phone]      Emergency Services Address: [address] [place] [address]      Emergency Services Phone: [phone]    Making the environment safe: How can I make my environment (house/apartment/living space) safer? For example, can I remove guns, medications, and other items? 1. Knife is put away  2.  Put away medications

## 2021-09-21 NOTE — BH NOTES
Pt discharged to home with wife. Pt was deemed to be safe after his TDO hearing this am. Pt received his Rx's and all discharge paperwork. He was escorted off the unit at 11:15am with all his belongings. Pt given all phone numbers for the suicide hotline, COPE, and Smoking Cessation Hotline. Pt verbalized understanding of all discharge information.

## 2021-09-21 NOTE — BH NOTES
Affect constricted, mood depressed. Alert and oriented x 4. Calm and cooperative. Appropriately dressed, makes eye contact. Interacts with staff and peers. Attended and participated in group. Ate 100% of snack. Medication compliant. Denies SI/HI/AVH and pain. No delusional statements made. Patient stable with no s/s of distress.   Patient laid in bed with eyes closed for 6 hours and 30 min      PRN Medication Documentation    Specific patient behavior that led to need for PRN medication: insomnia   Staff interventions attempted prior to PRN being given: requested  PRN medication given: trazodone 50 mg po at 2234  Patient response/effectiveness of PRN medication: tolerated

## 2021-09-21 NOTE — BH NOTES
Behavioral Health Transition Record to Provider    Patient Name: Brooks Frost  YOB: 1985  Medical Record Number: [id number]  Date of Admission: 9/20/2021  Date of Discharge: 9/21/21    Attending Provider: Bridger Powell MD  Discharging Provider: Randa Osgood  To contact this individual call [phone] and ask the  to page. If unavailable, ask to be transferred to [facility] Provider on call. [facility] Provider will be available on call 24/7 and during holidays. Primary Care Provider: None    Allergies   Allergen Reactions    Remeron [Mirtazapine] Other (comments)       Reason for Admission: Depression    Admission Diagnosis: Major depressive disorder [F32.9]    * No surgery found *    Results for orders placed or performed during the hospital encounter of 09/19/21   CBC WITH AUTOMATED DIFF   Result Value Ref Range    WBC 7.1 4.1 - 11.1 K/uL    RBC 4.20 4. 10 - 5.70 M/uL    HGB 13.3 12.1 - 17.0 g/dL    HCT 39.4 36.6 - 50.3 %    MCV 93.8 80.0 - 99.0 FL    MCH 31.7 26.0 - 34.0 PG    MCHC 33.8 30.0 - 36.5 g/dL    RDW 12.8 11.5 - 14.5 %    PLATELET 381 594 - 732 K/uL    MPV 11.4 8.9 - 12.9 FL    NRBC 0.0 0  WBC    ABSOLUTE NRBC 0.00 0.00 - 0.01 K/uL    NEUTROPHILS 60 32 - 75 %    LYMPHOCYTES 25 12 - 49 %    MONOCYTES 10 5 - 13 %    EOSINOPHILS 4 0 - 7 %    BASOPHILS 1 0 - 1 %    IMMATURE GRANULOCYTES 0 0.0 - 0.5 %    ABS. NEUTROPHILS 4.3 1.8 - 8.0 K/UL    ABS. LYMPHOCYTES 1.8 0.8 - 3.5 K/UL    ABS. MONOCYTES 0.7 0.0 - 1.0 K/UL    ABS. EOSINOPHILS 0.3 0.0 - 0.4 K/UL    ABS. BASOPHILS 0.1 0.0 - 0.1 K/UL    ABS. IMM.  GRANS. 0.0 0.00 - 0.04 K/UL    DF AUTOMATED     METABOLIC PANEL, COMPREHENSIVE   Result Value Ref Range    Sodium 140 136 - 145 mmol/L    Potassium 3.8 3.5 - 5.1 mmol/L    Chloride 106 97 - 108 mmol/L    CO2 26 21 - 32 mmol/L    Anion gap 8 5 - 15 mmol/L    Glucose 99 65 - 100 mg/dL    BUN 14 6 - 20 MG/DL    Creatinine 1.20 0.70 - 1.30 MG/DL BUN/Creatinine ratio 12 12 - 20      GFR est AA >60 >60 ml/min/1.73m2    GFR est non-AA >60 >60 ml/min/1.73m2    Calcium 8.9 8.5 - 10.1 MG/DL    Bilirubin, total 0.4 0.2 - 1.0 MG/DL    ALT (SGPT) 29 12 - 78 U/L    AST (SGOT) 17 15 - 37 U/L    Alk. phosphatase 74 45 - 117 U/L    Protein, total 7.5 6.4 - 8.2 g/dL    Albumin 3.6 3.5 - 5.0 g/dL    Globulin 3.9 2.0 - 4.0 g/dL    A-G Ratio 0.9 (L) 1.1 - 2.2     ETHYL ALCOHOL   Result Value Ref Range    ALCOHOL(ETHYL),SERUM <10 <10 MG/DL   DRUG SCREEN, URINE   Result Value Ref Range    AMPHETAMINES Negative NEG      BARBITURATES Negative NEG      BENZODIAZEPINES Negative NEG      COCAINE Negative NEG      METHADONE Negative NEG      OPIATES Negative NEG      PCP(PHENCYCLIDINE) Negative NEG      THC (TH-CANNABINOL) Negative NEG      Drug screen comment (NOTE)    COVID-19 WITH INFLUENZA A/B   Result Value Ref Range    SARS-CoV-2 Not detected NOTD      Influenza A by PCR Not detected      Influenza B by PCR Not detected     URINALYSIS W/ REFLEX CULTURE    Specimen: Miscellaneous sample; Urine    Urine specimen   Result Value Ref Range    Color YELLOW/STRAW      Appearance CLEAR CLEAR      Specific gravity 1.030 1.003 - 1.030      pH (UA) 6.0 5.0 - 8.0      Protein Negative NEG mg/dL    Glucose Negative NEG mg/dL    Ketone Negative NEG mg/dL    Blood Negative NEG      Urobilinogen 1.0 0.2 - 1.0 EU/dL    Nitrites Negative NEG      Leukocyte Esterase Negative NEG      WBC 0-4 0 - 4 /hpf    RBC 0-5 0 - 5 /hpf    Epithelial cells FEW FEW /lpf    Bacteria Negative NEG /hpf    UA:UC IF INDICATED CULTURE NOT INDICATED BY UA RESULT CNI      Mucus 2+ (A) NEG /lpf   BILIRUBIN, CONFIRM   Result Value Ref Range    Bilirubin UA, confirm Negative NEG         Immunizations administered during this encounter: There is no immunization history on file for this patient.     Screening for Metabolic Disorders for Patients on Antipsychotic Medications  (Data obtained from the EMR)    Estimated Body Mass Index  Estimated body mass index is 27.44 kg/m² as calculated from the following:    Height as of this encounter: 6' 1\" (1.854 m). Weight as of this encounter: 94.3 kg (208 lb). Vital Signs/Blood Pressure  Visit Vitals  /62 (BP 1 Location: Left upper arm, BP Patient Position: Sitting)   Pulse 62   Temp (!) 96.4 °F (35.8 °C)   Resp 17   Ht 6' 1\" (1.854 m)   Wt 94.3 kg (208 lb)   SpO2 97%   BMI 27.44 kg/m²       Blood Glucose/Hemoglobin A1c  Lab Results   Component Value Date/Time    Glucose 99 09/19/2021 10:10 PM       No results found for: HBA1C, [id number]     Lipid Panel  No results found for: CHOL, CHOLX, CHLST, CHOLV, 392544, HDL, HDLP, LDL, LDLC, DLDLP, TGLX, TRIGL, TRIGP, CHHD, CHHDX     Discharge Diagnosis: Major Depression, recurrent, moderate to severe    Discharge Plan: Please keep all scheduled follow up appointments. If you are unable to keep your appointment with your physician or therapist, please call them at least 24 hours in advance, if possible, so another appointment can be scheduled. It is important that you maintain contact with your physician(s) after discharge. Discharge Medication List and Instructions:   Current Discharge Medication List      START taking these medications    Details   buPROPion XL (WELLBUTRIN XL) 300 mg XL tablet Take 1 Tablet by mouth daily.   Qty: 30 Tablet, Refills: 1  Start date: 9/21/2021         STOP taking these medications       FLUoxetine (PROzac) 20 mg capsule Comments:   Reason for Stopping:         melatonin 3 mg tablet Comments:   Reason for Stopping:         traZODone (DESYREL) 50 mg tablet Comments:   Reason for Stopping:               Unresulted Labs (24h ago, onward)    None        To obtain results of studies pending at discharge, please contact     Follow-up Information     Follow up With Specialties Details [address] 19 Wednesday 9a-1p same day access walk in [address] [place] [address] Melvin Estrada  [phone]          Advanced Directive:   Does the patient have an appointed surrogate decision maker? No  Does the patient have a Medical Advance Directive? No  Does the patient have a Psychiatric Advance Directive? No  If the patient does not have a surrogate or Medical Advance Directive AND Psychiatric Advance Directive, the patient was offered information on these advance directives Yes and Patient will complete at a later time    Patient Instructions: Please continue all medications until otherwise directed by physician. Tobacco Cessation Discharge Plan:   Is the patient a smoker and needs referral for smoking cessation? No  Patient referred to the following for smoking cessation with an appointment? Not applicable     Patient was offered medication to assist with smoking cessation at discharge? Not applicable  Was education for smoking cessation added to the discharge instructions? Not applicable    Alcohol/Substance Abuse Discharge Plan:   Does the patient have a history of substance/alcohol abuse and requires a referral for treatment? No  Patient referred to the following for substance/alcohol abuse treatment with an appointment? Not applicable  Patient was offered medication to assist with alcohol cessation at discharge? Not applicable  Was education for substance/alcohol abuse added to discharge instructions? Not applicable    Patient discharged to Home; discussed with patient/caregiver and provided to the patient/caregiver either in hard copy or electronically.

## 2021-09-21 NOTE — GROUP NOTE
[facility]  GROUP DOCUMENTATION INDIVIDUAL                                                                          Group Therapy Note    Date: 9/21/2021    Group Start Time: 1300  Group End Time: 1350  Group Topic: Process Group - Inpatient    [address], [address] GROUP DOCUMENTATION GROUP    Group Therapy Note    Attendees: Focus of session was based on information from the book When Your Past is Hurting Your Present by Kristin Womack. I shared ideas from the book about how to develop courage and be willing and able to take risks that will help build confidence, self esteem, and increased well being. We spoke about how being stuck in a rut for a long time impacts our courage to take even small risks and we spoke about making an very focused effort on taking small risks daily. We discussed how we also often overthink the things and events that feel like risks and are scary and we wish we had enjoyed the ride.   We spoke about a risk that group members were willing to commit to today to do this week.            Attendance: {[facility] ATTENDANCE:08964}    Patient's Goal:  ***    Interventions/techniques: {[facility] GROUP DOC INTERVENTIONS/TECHNIQUES:95254}    Follows Directions: {[facility] FOLLOWS DIRECTION:43451}    Interactions: {[facility] INTERACTIONS:97862}    Mental Status: {[facility] GROUP DOC MENTAL STATUS:34096}    Behavior/appearance: {[facility] GROUP DOC BEHAVIOR/APPEARANCE:70687}    Goals Achieved: {[facility] GROUP DOC GOALS ACHIEVED:86123}      Additional Notes:  ***    Tony Welch

## 2021-12-31 NOTE — BSMART NOTE
Patient to be TDO per D19 meghan.   Dr. Hector Almaraz notified, reports pt is accepted to [facility] pending medical clearance and negative COVID test due to SI. no

## 2022-02-07 ENCOUNTER — HOSPITAL ENCOUNTER (INPATIENT)
Age: 37
LOS: 4 days | Discharge: HOME | DRG: 753 | End: 2022-02-11
Attending: INTERNAL MEDICINE | Admitting: PSYCHIATRY & NEUROLOGY
Payer: MEDICAID

## 2022-02-07 DIAGNOSIS — R45.851 SUICIDAL IDEATION: Primary | ICD-10-CM

## 2022-02-07 DIAGNOSIS — U07.1 COVID-19: ICD-10-CM

## 2022-02-07 PROBLEM — F99 PSYCHIATRIC COMPLAINT: Status: ACTIVE | Noted: 2022-02-07

## 2022-02-07 LAB
ALBUMIN SERPL-MCNC: 3.9 G/DL (ref 3.5–5)
ALBUMIN/GLOB SERPL: 1 {RATIO} (ref 1.1–2.2)
ALP SERPL-CCNC: 67 U/L (ref 45–117)
ALT SERPL-CCNC: 34 U/L (ref 12–78)
AMPHET UR QL SCN: NEGATIVE
ANION GAP SERPL CALC-SCNC: 4 MMOL/L (ref 5–15)
APAP SERPL-MCNC: <10 UG/ML (ref 10–30)
APPEARANCE UR: CLEAR
AST SERPL W P-5'-P-CCNC: 26 U/L (ref 15–37)
BACTERIA URNS QL MICRO: NEGATIVE /HPF
BARBITURATES UR QL SCN: NEGATIVE
BASOPHILS # BLD: 0 K/UL (ref 0–0.1)
BASOPHILS NFR BLD: 0 % (ref 0–1)
BENZODIAZ UR QL: NEGATIVE
BILIRUB SERPL-MCNC: 0.8 MG/DL (ref 0.2–1)
BILIRUB UR QL: NEGATIVE
BUN SERPL-MCNC: 20 MG/DL (ref 6–20)
BUN/CREAT SERPL: 16 (ref 12–20)
CA-I BLD-MCNC: 8.7 MG/DL (ref 8.5–10.1)
CANNABINOIDS UR QL SCN: NEGATIVE
CHLORIDE SERPL-SCNC: 106 MMOL/L (ref 97–108)
CO2 SERPL-SCNC: 27 MMOL/L (ref 21–32)
COCAINE UR QL SCN: NEGATIVE
COLOR UR: YELLOW
CREAT SERPL-MCNC: 1.29 MG/DL (ref 0.7–1.3)
DATE LAST DOSE: ABNORMAL
DATE LAST DOSE: ABNORMAL
DIFFERENTIAL METHOD BLD: ABNORMAL
DRUG SCRN COMMENT,DRGCM: NORMAL
EOSINOPHIL # BLD: 0 K/UL (ref 0–0.4)
EOSINOPHIL NFR BLD: 0 % (ref 0–7)
ERYTHROCYTE [DISTWIDTH] IN BLOOD BY AUTOMATED COUNT: 13.7 % (ref 11.5–14.5)
ETHANOL SERPL-MCNC: <4 MG/DL
FLUAV RNA SPEC QL NAA+PROBE: NOT DETECTED
FLUBV RNA SPEC QL NAA+PROBE: NOT DETECTED
GLOBULIN SER CALC-MCNC: 3.9 G/DL (ref 2–4)
GLUCOSE SERPL-MCNC: 84 MG/DL (ref 65–100)
GLUCOSE UR STRIP.AUTO-MCNC: NEGATIVE MG/DL
HCT VFR BLD AUTO: 43.8 % (ref 36.6–50.3)
HGB BLD-MCNC: 14.3 G/DL (ref 12.1–17)
HGB UR QL STRIP: ABNORMAL
IMM GRANULOCYTES # BLD AUTO: 0 K/UL
IMM GRANULOCYTES NFR BLD AUTO: 0 %
KETONES UR QL STRIP.AUTO: 80 MG/DL
LEUKOCYTE ESTERASE UR QL STRIP.AUTO: NEGATIVE
LYMPHOCYTES # BLD: 1.8 K/UL (ref 0.8–3.5)
LYMPHOCYTES NFR BLD: 39 % (ref 12–49)
MCH RBC QN AUTO: 31 PG (ref 26–34)
MCHC RBC AUTO-ENTMCNC: 32.6 G/DL (ref 30–36.5)
MCV RBC AUTO: 94.8 FL (ref 80–99)
METHADONE UR QL: NEGATIVE
MONOCYTES # BLD: 0.8 K/UL (ref 0–1)
MONOCYTES NFR BLD: 17 % (ref 5–13)
MUCOUS THREADS URNS QL MICRO: ABNORMAL /LPF
NEUTS SEG # BLD: 2 K/UL (ref 1.8–8)
NEUTS SEG NFR BLD: 44 % (ref 32–75)
NITRITE UR QL STRIP.AUTO: NEGATIVE
NRBC # BLD: 0 K/UL (ref 0–0.01)
NRBC BLD-RTO: 0 PER 100 WBC
OPIATES UR QL: NEGATIVE
PCP UR QL: NEGATIVE
PH UR STRIP: 5 [PH] (ref 5–8)
PLATELET # BLD AUTO: 154 K/UL (ref 150–400)
PMV BLD AUTO: 11.4 FL (ref 8.9–12.9)
POTASSIUM SERPL-SCNC: 3.5 MMOL/L (ref 3.5–5.1)
PROT SERPL-MCNC: 7.8 G/DL (ref 6.4–8.2)
PROT UR STRIP-MCNC: 30 MG/DL
RBC # BLD AUTO: 4.62 M/UL (ref 4.1–5.7)
RBC #/AREA URNS HPF: ABNORMAL /HPF (ref 0–5)
RBC MORPH BLD: ABNORMAL
REPORTED DOSE,DOSE: ABNORMAL UNITS
REPORTED DOSE,DOSE: ABNORMAL UNITS
SALICYLATES SERPL-MCNC: 1.8 MG/DL (ref 2.8–20)
SARS-COV-2, COV2: DETECTED
SODIUM SERPL-SCNC: 137 MMOL/L (ref 136–145)
SP GR UR REFRACTOMETRY: >1.03 (ref 1–1.03)
UROBILINOGEN UR QL STRIP.AUTO: 2 EU/DL (ref 0.1–1)
WBC # BLD AUTO: 4.6 K/UL (ref 4.1–11.1)
WBC URNS QL MICRO: ABNORMAL /HPF (ref 0–4)

## 2022-02-07 PROCEDURE — 85025 COMPLETE CBC W/AUTO DIFF WBC: CPT

## 2022-02-07 PROCEDURE — 80143 DRUG ASSAY ACETAMINOPHEN: CPT

## 2022-02-07 PROCEDURE — 80053 COMPREHEN METABOLIC PANEL: CPT

## 2022-02-07 PROCEDURE — 80307 DRUG TEST PRSMV CHEM ANLYZR: CPT

## 2022-02-07 PROCEDURE — 87636 SARSCOV2 & INF A&B AMP PRB: CPT

## 2022-02-07 PROCEDURE — 82077 ASSAY SPEC XCP UR&BREATH IA: CPT

## 2022-02-07 PROCEDURE — 36415 COLL VENOUS BLD VENIPUNCTURE: CPT

## 2022-02-07 PROCEDURE — 80179 DRUG ASSAY SALICYLATE: CPT

## 2022-02-07 PROCEDURE — 81001 URINALYSIS AUTO W/SCOPE: CPT

## 2022-02-07 PROCEDURE — 99284 EMERGENCY DEPT VISIT MOD MDM: CPT

## 2022-02-07 PROCEDURE — 65270000029 HC RM PRIVATE

## 2022-02-07 NOTE — ED PROVIDER NOTES
EMERGENCY DEPARTMENT HISTORY AND PHYSICAL EXAM      Date: 2/7/2022  Patient Name: Scott Estevez    History of Presenting Illness     Chief Complaint   Patient presents with   [address]       History Provided By: Patient    HPI: Scott Estevez, 43 y.o. male with a past medical history significant for MDD who presents to the ED sent here by D19 for evaluation of suicidal ideations today. Patient states he \"just wants to end it\". No specific plan but has attempted suicide in the past.  Associated symptoms include anxiety, decreased appetite, trouble sleeping. Occasional marijuana use. Uses alcohol weekly and has noted increased use over the last 1 week. Patient is voluntary for admission. Is not currently on any medications. Of note, states that multiple family members in his house have had cough and cold symptoms. Patient himself developed a fever (unmeasured) yesterday. Is fully vaccinated for COVID-19. Patient denies chills, cough, congestion, sore throat, chest pain, shortness of breath, nausea, vomiting, diarrhea, HI. There are no other complaints, changes, or physical findings at this time. PCP: None    No current facility-administered medications on file prior to encounter. Current Outpatient Medications on File Prior to Encounter   Medication Sig Dispense Refill    buPROPion XL (WELLBUTRIN XL) 300 mg XL tablet Take 1 Tablet by mouth daily. 30 Tablet 1       Past History     Past Medical History:  Past Medical History:   Diagnosis Date    Depression        Past Surgical History:  History reviewed. No pertinent surgical history. Family History:  History reviewed. No pertinent family history.     Social History:  Social History     Tobacco Use    Smoking status: Current Every Day Smoker     Packs/day: 1.00    Smokeless tobacco: Never Used    Tobacco comment: one cigar per day   Vaping Use    Vaping Use: Never used   Substance Use Topics    Alcohol use: Not Currently    Drug use: Not Currently       Allergies: Allergies   Allergen Reactions    Remeron [Mirtazapine] Other (comments)         Review of Systems     Review of Systems   Constitutional: Positive for appetite change (decreased) and fever. Negative for chills and fatigue. HENT: Negative. Respiratory: Negative for cough, chest tightness, shortness of breath and wheezing. Cardiovascular: Negative for chest pain and palpitations. Gastrointestinal: Negative for abdominal pain, diarrhea, nausea and vomiting. Genitourinary: Negative for frequency and urgency. Musculoskeletal: Negative for back pain, neck pain and neck stiffness. Skin: Negative for rash. Neurological: Negative for dizziness, weakness, light-headedness and headaches. Psychiatric/Behavioral: Positive for sleep disturbance and suicidal ideas. Negative for agitation, behavioral problems, confusion, decreased concentration, dysphoric mood, hallucinations and self-injury. The patient is nervous/anxious. The patient is not hyperactive. All other systems reviewed and are negative. Physical Exam     Physical Exam  Vitals and nursing note reviewed. Constitutional:       General: He is not in acute distress. Appearance: Normal appearance. He is well-developed. He is not ill-appearing, toxic-appearing or diaphoretic. HENT:      Head: Normocephalic and atraumatic. Nose: Nose normal. No congestion or rhinorrhea. Mouth/Throat:      Mouth: Mucous membranes are moist.      Pharynx: Oropharynx is clear. No oropharyngeal exudate or posterior oropharyngeal erythema. Eyes:      General: No scleral icterus. Conjunctiva/sclera: Conjunctivae normal.      Pupils: Pupils are equal, round, and reactive to light. Cardiovascular:      Rate and Rhythm: Normal rate and regular rhythm. Pulses:           Radial pulses are 2+ on the right side and 2+ on the left side.         Dorsalis pedis pulses are 2+ on the right side and 2+ on the left side. Heart sounds: No murmur heard. No friction rub. No gallop. Pulmonary:      Effort: Pulmonary effort is normal. No tachypnea, accessory muscle usage, respiratory distress or retractions. Breath sounds: Normal breath sounds. No stridor. No decreased breath sounds, wheezing, rhonchi or rales. Chest:      Chest wall: No tenderness. Abdominal:      General: Bowel sounds are normal. There is no distension. Palpations: Abdomen is soft. There is no mass. Tenderness: There is no abdominal tenderness. There is no right CVA tenderness, left CVA tenderness, guarding or rebound. Musculoskeletal:         General: No deformity. Normal range of motion. Cervical back: Normal range of motion and neck supple. No rigidity. No muscular tenderness. Right lower leg: No edema. Left lower leg: No edema. Skin:     General: Skin is warm and dry. Capillary Refill: Capillary refill takes less than 2 seconds. Coloration: Skin is not jaundiced or pale. Findings: No bruising, erythema or rash. Neurological:      General: No focal deficit present. Mental Status: He is alert and oriented to person, place, and time. Mental status is at baseline. Sensory: Sensation is intact. Motor: Motor function is intact. Psychiatric:         Mood and Affect: Mood is depressed. Affect is flat. Behavior: Behavior is withdrawn. Behavior is cooperative. Thought Content: Thought content is not paranoid or delusional. Thought content includes suicidal ideation. Thought content does not include homicidal ideation. Thought content does not include homicidal or suicidal plan.          Lab and Diagnostic Study Results     Labs -  Recent Results (from the past 24 hour(s))   CBC WITH AUTOMATED DIFF    Collection Time: 02/07/22  5:00 PM   Result Value Ref Range    WBC 4.6 4.1 - 11.1 K/uL    RBC 4.62 4.10 - 5.70 M/uL    HGB 14.3 12.1 - 17.0 g/dL    HCT 43.8 36.6 - 50.3 % MCV 94.8 80.0 - 99.0 FL    MCH 31.0 26.0 - 34.0 PG    MCHC 32.6 30.0 - 36.5 g/dL    RDW 13.7 11.5 - 14.5 %    PLATELET 546 285 - 694 K/uL    MPV 11.4 8.9 - 12.9 FL    NRBC 0.0 0.0  WBC    ABSOLUTE NRBC 0.00 0.00 - 0.01 K/uL    NEUTROPHILS 44 32 - 75 %    LYMPHOCYTES 39 12 - 49 %    MONOCYTES 17 (H) 5 - 13 %    EOSINOPHILS 0 0 - 7 %    BASOPHILS 0 0 - 1 %    IMMATURE GRANULOCYTES 0 %    ABS. NEUTROPHILS 2.0 1.8 - 8.0 K/UL    ABS. LYMPHOCYTES 1.8 0.8 - 3.5 K/UL    ABS. MONOCYTES 0.8 0.0 - 1.0 K/UL    ABS. EOSINOPHILS 0.0 0.0 - 0.4 K/UL    ABS. BASOPHILS 0.0 0.0 - 0.1 K/UL    ABS. IMM. GRANS. 0.0 K/UL    DF Manual      RBC COMMENTS Normocytic, Normochromic     METABOLIC PANEL, COMPREHENSIVE    Collection Time: 02/07/22  5:00 PM   Result Value Ref Range    Sodium 137 136 - 145 mmol/L    Potassium 3.5 3.5 - 5.1 mmol/L    Chloride 106 97 - 108 mmol/L    CO2 27 21 - 32 mmol/L    Anion gap 4 (L) 5 - 15 mmol/L    Glucose 84 65 - 100 mg/dL    BUN 20 6 - 20 mg/dL    Creatinine 1.29 0.70 - 1.30 mg/dL    BUN/Creatinine ratio 16 12 - 20      GFR est AA >60 >60 ml/min/1.73m2    GFR est non-AA >60 >60 ml/min/1.73m2    Calcium 8.7 8.5 - 10.1 mg/dL    Bilirubin, total 0.8 0.2 - 1.0 mg/dL    AST (SGOT) 26 15 - 37 U/L    ALT (SGPT) 34 12 - 78 U/L    Alk.  phosphatase 67 45 - 117 U/L    Protein, total 7.8 6.4 - 8.2 g/dL    Albumin 3.9 3.5 - 5.0 g/dL    Globulin 3.9 2.0 - 4.0 g/dL    A-G Ratio 1.0 (L) 1.1 - 2.2     ETHYL ALCOHOL    Collection Time: 02/07/22  5:00 PM   Result Value Ref Range    ALCOHOL(ETHYL),SERUM <4 <10 mg/dL   URINALYSIS W/MICROSCOPIC    Collection Time: 02/07/22  5:00 PM   Result Value Ref Range    Color Yellow      Appearance Clear Clear      Specific gravity >1.030 (H) 1.003 - 1.030    pH (UA) 5.0 5.0 - 8.0      Protein 30 (A) Negative mg/dL    Glucose Negative Negative mg/dL    Ketone 80 (A) Negative mg/dL    Bilirubin Negative Negative      Blood Small (A) Negative      Urobilinogen 2.0 (H) 0.1 - 1.0 EU/dL    Nitrites Negative Negative      Leukocyte Esterase Negative Negative      WBC 0-4 0 - 4 /hpf    RBC 20-50 0 - 5 /hpf    Bacteria Negative Negative /hpf    Mucus 3+ /lpf   COVID-19 WITH INFLUENZA A/B    Collection Time: 02/07/22  5:00 PM   Result Value Ref Range    SARS-CoV-2 DETECTED (A) Not Detected      Influenza A by PCR Not Detected Not Detected      Influenza B by PCR Not Detected Not Detected     DRUG SCREEN, URINE    Collection Time: 02/07/22  5:00 PM   Result Value Ref Range    AMPHETAMINES Negative Negative      BARBITURATES Negative Negative      BENZODIAZEPINES Negative Negative      COCAINE Negative Negative      METHADONE Negative Negative      OPIATES Negative Negative      PCP(PHENCYCLIDINE) Negative Negative      THC (TH-CANNABINOL) Negative Negative      Drug screen comment        This test is a screen for drugs of abuse in a medical setting only (i.e., they are unconfirmed results and as such must not be used for non-medical purposes, e.g.,employment testing, legal testing). Due to its inherent nature, false positive (FP) and false negative (FN) results may be obtained. Therefore, if necessary for medical care, recommend confirmation of positive findings by GC/MS. ACETAMINOPHEN    Collection Time: 02/07/22  5:00 PM   Result Value Ref Range    Acetaminophen level <10 (L) 10 - 30 ug/mL    Reported dose date Dose Dependent      Reported dose: Dose Dependent Units   SALICYLATE    Collection Time: 02/07/22  5:00 PM   Result Value Ref Range    Salicylate level 1.8 (L) 2.8 - 20.0 mg/dL    Reported dose date Dose Dependent      Reported dose: Dose Dependent Units       Radiologic Studies -   No orders to display       Medical Decision Making   - I am the first provider for this patient. - I reviewed the vital signs, available nursing notes, past medical history, past surgical history, family history and social history. - Initial assessment performed.  The patients presenting problems have been discussed, and they are in agreement with the care plan formulated and outlined with them. I have encouraged them to ask questions as they arise throughout their visit. Vital Signs-Reviewed the patient's vital signs. Patient Vitals for the past 24 hrs:   Temp Pulse Resp BP SpO2   02/07/22 1715     99 %   02/07/22 1605    118/76    02/07/22 1603 98.3 °F (36.8 °C) 85 18  99 %       Records Reviewed: Nursing Notes and Old Medical Records    The patient presents with suicidal ideation with a differential diagnosis of suicidal ideation, anxiety, depression, insomnia, covid19, influenza, viral syndrome      ED Course:     ED Course as of 02/07/22 1916   Mon Feb 07, 2022   1815 Patient seen/evaluated by behavioral health. Will be admitted for psychiatric treatment/evaluation. Patient is medically clear. However, is positive for covid-19. [facility] states patient is to be admitted medically and psychiatry will see in consultation given positive covid-19. [NO]   1908 CONSULT NOTE:  Consultant: Dr. Oscar Oconnell  Specialty: Hospitalist  Discussed pt's history, disposition, and available diagnostic and imaging results. Reviewed care plans. Patient will be admitted to the hospital for further management. SAMANTHA Ramey     [NO]      ED Course User Index  [NO] Asencion Bumpers, PA         Provider Notes (Medical Decision Making):     MDM  Number of Diagnoses or Management Options  COVID-19  Suicidal ideation  Diagnosis management comments:     42-year-old male with suicidal ideations without specific plan. Seen and evaluated by behavioral health who recommended admission. All labs without any remarkable findings. Patient did test positive for COVID-19. States he only developed a fever yesterday but no other symptoms. Patient is afebrile in the ED. Nontoxic-appearing. Do not think further work-up is needed. He is medically clear for psychiatric admission.   Given positive COVID-19 status, patient will be admitted to hospitalist and psychiatry will see patient in consultation. Amount and/or Complexity of Data Reviewed  Clinical lab tests: ordered and reviewed  Review and summarize past medical records: yes  Discuss the patient with other providers: yes    Patient Progress  Patient progress: stable           Disposition   Disposition: Medical admission to hospitalist with psychiatric evaluation      Diagnosis     Clinical Impression:   1. Suicidal ideation    2. COVID-19        Attestations:    SAMANTHA Villasenor    Please note that this dictation was completed with [employer], the computer voice recognition software. Quite often unanticipated grammatical, syntax, homophones, and other interpretive errors are inadvertently transcribed by the computer software. Please disregard these errors. Please excuse any errors that have escaped final proofreading. Thank you.

## 2022-02-07 NOTE — ED NOTES
Bedside shift change report given to Misty Hernandez RN  (oncoming nurse) by Alex Argueta RN  (offgoing nurse). Report included the following information SBAR, Kardex, ED Summary, [facility] and Recent Results.
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
## 2022-02-07 NOTE — ED TRIAGE NOTES
Pt sent by D19. He reports feeling suicidal without a plan. Denies HI/AVH. Not on medication because he cant find a doctor. Reports increased ETOH use in the past month.

## 2022-02-07 NOTE — ED NOTES
Assumed care of pt. Now in room 21, pt is in green gown. Room is psych-safe and free of ligature risk. Personal belongings (orange hoodie, jeans, black sneakers, black wallet) placed in bags, labeled, and put in bin at nursing station 3. Constant observer at bedside. Pt speaking on cell phone at this time, in no apparent distress. Calm and cooperative during check-in process. Will continue to monitor.

## 2022-02-08 PROCEDURE — 74011250637 HC RX REV CODE- 250/637: Performed by: INTERNAL MEDICINE

## 2022-02-08 PROCEDURE — 65270000029 HC RM PRIVATE

## 2022-02-08 PROCEDURE — 74011250636 HC RX REV CODE- 250/636: Performed by: INTERNAL MEDICINE

## 2022-02-08 RX ORDER — ENOXAPARIN SODIUM 100 MG/ML
30 INJECTION SUBCUTANEOUS EVERY 12 HOURS
Status: DISCONTINUED | OUTPATIENT
Start: 2022-02-08 | End: 2022-02-11 | Stop reason: HOSPADM

## 2022-02-08 RX ORDER — SODIUM CHLORIDE 0.9 % (FLUSH) 0.9 %
5-40 SYRINGE (ML) INJECTION AS NEEDED
Status: DISCONTINUED | OUTPATIENT
Start: 2022-02-08 | End: 2022-02-11 | Stop reason: HOSPADM

## 2022-02-08 RX ORDER — SODIUM CHLORIDE 0.9 % (FLUSH) 0.9 %
5-40 SYRINGE (ML) INJECTION EVERY 8 HOURS
Status: DISCONTINUED | OUTPATIENT
Start: 2022-02-08 | End: 2022-02-11 | Stop reason: HOSPADM

## 2022-02-08 RX ORDER — ACETAMINOPHEN 325 MG/1
650 TABLET ORAL
Status: DISCONTINUED | OUTPATIENT
Start: 2022-02-08 | End: 2022-02-11 | Stop reason: HOSPADM

## 2022-02-08 RX ORDER — ONDANSETRON 4 MG/1
4 TABLET, ORALLY DISINTEGRATING ORAL
Status: DISCONTINUED | OUTPATIENT
Start: 2022-02-08 | End: 2022-02-11 | Stop reason: HOSPADM

## 2022-02-08 RX ORDER — ACETAMINOPHEN 650 MG/1
650 SUPPOSITORY RECTAL
Status: DISCONTINUED | OUTPATIENT
Start: 2022-02-08 | End: 2022-02-11 | Stop reason: HOSPADM

## 2022-02-08 RX ORDER — ONDANSETRON 2 MG/ML
4 INJECTION INTRAMUSCULAR; INTRAVENOUS
Status: DISCONTINUED | OUTPATIENT
Start: 2022-02-08 | End: 2022-02-11 | Stop reason: HOSPADM

## 2022-02-08 RX ORDER — POLYETHYLENE GLYCOL 3350 17 G/17G
17 POWDER, FOR SOLUTION ORAL DAILY PRN
Status: DISCONTINUED | OUTPATIENT
Start: 2022-02-08 | End: 2022-02-11 | Stop reason: HOSPADM

## 2022-02-08 RX ADMIN — ENOXAPARIN SODIUM 30 MG: 100 INJECTION SUBCUTANEOUS at 16:54

## 2022-02-08 RX ADMIN — ACETAMINOPHEN 650 MG: 325 TABLET ORAL at 22:05

## 2022-02-08 RX ADMIN — ENOXAPARIN SODIUM 30 MG: 100 INJECTION SUBCUTANEOUS at 05:38

## 2022-02-08 NOTE — BH NOTES
Pt states he is feeling better. He states he has 0A, 0D, 0SI/HI, 0AVH. He does state when he gets into arguments with his girlfriend and she threatens to leave him then he has high anxiety and depression. He states he's been diagnosed with BPD in the past and doesn't do well with perceived abandonment.
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
## 2022-02-08 NOTE — PROGRESS NOTES
Hospitalist Progress Note    Subjective:   Daily Progress Note: 2022 3:42 PM    Hospital Course:  Garrel Litten is a 43 y.o. male with history of MDD, presented to the ED for evaluation of suicidal ideation today. Per patient, he states that he \" just wants to end it\". Denies specific plan, but admits to previous suicidal attempts in the past.  He complains of anxiety, decreased appetite, difficulty sleeping. In addition, he also complains of chest congestion and nonproductive cough for the past 3 days, associated with 1 day history of fever and chills. Subjective: Pt seen in the room, sitter present, denies suicide ideation and self harm. Current Facility-Administered Medications   Medication Dose Route Frequency    sodium chloride (NS) flush 5-40 mL  5-40 mL IntraVENous Q8H    sodium chloride (NS) flush 5-40 mL  5-40 mL IntraVENous PRN    acetaminophen (TYLENOL) tablet 650 mg  650 mg Oral Q6H PRN    Or    acetaminophen (TYLENOL) suppository 650 mg  650 mg Rectal Q6H PRN    polyethylene glycol (MIRALAX) packet 17 g  17 g Oral DAILY PRN    ondansetron (ZOFRAN ODT) tablet 4 mg  4 mg Oral Q8H PRN    Or    ondansetron (ZOFRAN) injection 4 mg  4 mg IntraVENous Q6H PRN    enoxaparin (LOVENOX) injection 30 mg  30 mg SubCUTAneous Q12H        Review of Systems:    Review of Systems   Constitutional: Negative for fever and malaise/fatigue. Cardiovascular: Negative for chest pain and palpitations. Gastrointestinal: Negative for heartburn, nausea and vomiting. Genitourinary: Negative for dysuria and urgency. Neurological: Negative for dizziness.         Objective:     Visit Vitals  /73 (BP 1 Location: Left upper arm, BP Patient Position: Lying)   Pulse 84   Temp 98.1 °F (36.7 °C)   Resp 18   Ht 6' (1.829 m)   Wt 98.4 kg (217 lb)   SpO2 95%   BMI 29.43 kg/m²      O2 Device: None (Room air)    Temp (24hrs), Av.2 °F (36.8 °C), Min:97.9 °F (36.6 °C), Max:98.3 °F (36.8 °C)      No intake/output data recorded. No intake/output data recorded. PHYSICAL EXAM:    Physical Exam  Constitutional:       General: He is not in acute distress. Cardiovascular:      Rate and Rhythm: Normal rate and regular rhythm. Pulses: Normal pulses. Heart sounds: Normal heart sounds. Abdominal:      General: Bowel sounds are normal.      Palpations: Abdomen is soft. Skin:     General: Skin is warm and dry. Neurological:      Mental Status: He is oriented to person, place, and time. Data Review    Recent Results (from the past 24 hour(s))   CBC WITH AUTOMATED DIFF    Collection Time: 02/07/22  5:00 PM   Result Value Ref Range    WBC 4.6 4.1 - 11.1 K/uL    RBC 4.62 4.10 - 5.70 M/uL    HGB 14.3 12.1 - 17.0 g/dL    HCT 43.8 36.6 - 50.3 %    MCV 94.8 80.0 - 99.0 FL    MCH 31.0 26.0 - 34.0 PG    MCHC 32.6 30.0 - 36.5 g/dL    RDW 13.7 11.5 - 14.5 %    PLATELET 311 110 - 083 K/uL    MPV 11.4 8.9 - 12.9 FL    NRBC 0.0 0.0  WBC    ABSOLUTE NRBC 0.00 0.00 - 0.01 K/uL    NEUTROPHILS 44 32 - 75 %    LYMPHOCYTES 39 12 - 49 %    MONOCYTES 17 (H) 5 - 13 %    EOSINOPHILS 0 0 - 7 %    BASOPHILS 0 0 - 1 %    IMMATURE GRANULOCYTES 0 %    ABS. NEUTROPHILS 2.0 1.8 - 8.0 K/UL    ABS. LYMPHOCYTES 1.8 0.8 - 3.5 K/UL    ABS. MONOCYTES 0.8 0.0 - 1.0 K/UL    ABS. EOSINOPHILS 0.0 0.0 - 0.4 K/UL    ABS. BASOPHILS 0.0 0.0 - 0.1 K/UL    ABS. IMM.  GRANS. 0.0 K/UL    DF Manual      RBC COMMENTS Normocytic, Normochromic     METABOLIC PANEL, COMPREHENSIVE    Collection Time: 02/07/22  5:00 PM   Result Value Ref Range    Sodium 137 136 - 145 mmol/L    Potassium 3.5 3.5 - 5.1 mmol/L    Chloride 106 97 - 108 mmol/L    CO2 27 21 - 32 mmol/L    Anion gap 4 (L) 5 - 15 mmol/L    Glucose 84 65 - 100 mg/dL    BUN 20 6 - 20 mg/dL    Creatinine 1.29 0.70 - 1.30 mg/dL    BUN/Creatinine ratio 16 12 - 20      GFR est AA >60 >60 ml/min/1.73m2    GFR est non-AA >60 >60 ml/min/1.73m2    Calcium 8.7 8.5 - 10.1 mg/dL    Bilirubin, total 0.8 0.2 - 1.0 mg/dL    AST (SGOT) 26 15 - 37 U/L    ALT (SGPT) 34 12 - 78 U/L    Alk. phosphatase 67 45 - 117 U/L    Protein, total 7.8 6.4 - 8.2 g/dL    Albumin 3.9 3.5 - 5.0 g/dL    Globulin 3.9 2.0 - 4.0 g/dL    A-G Ratio 1.0 (L) 1.1 - 2.2     ETHYL ALCOHOL    Collection Time: 02/07/22  5:00 PM   Result Value Ref Range    ALCOHOL(ETHYL),SERUM <4 <10 mg/dL   URINALYSIS W/MICROSCOPIC    Collection Time: 02/07/22  5:00 PM   Result Value Ref Range    Color Yellow      Appearance Clear Clear      Specific gravity >1.030 (H) 1.003 - 1.030    pH (UA) 5.0 5.0 - 8.0      Protein 30 (A) Negative mg/dL    Glucose Negative Negative mg/dL    Ketone 80 (A) Negative mg/dL    Bilirubin Negative Negative      Blood Small (A) Negative      Urobilinogen 2.0 (H) 0.1 - 1.0 EU/dL    Nitrites Negative Negative      Leukocyte Esterase Negative Negative      WBC 0-4 0 - 4 /hpf    RBC 20-50 0 - 5 /hpf    Bacteria Negative Negative /hpf    Mucus 3+ /lpf   COVID-19 WITH INFLUENZA A/B    Collection Time: 02/07/22  5:00 PM   Result Value Ref Range    SARS-CoV-2 DETECTED (A) Not Detected      Influenza A by PCR Not Detected Not Detected      Influenza B by PCR Not Detected Not Detected     DRUG SCREEN, URINE    Collection Time: 02/07/22  5:00 PM   Result Value Ref Range    AMPHETAMINES Negative Negative      BARBITURATES Negative Negative      BENZODIAZEPINES Negative Negative      COCAINE Negative Negative      METHADONE Negative Negative      OPIATES Negative Negative      PCP(PHENCYCLIDINE) Negative Negative      THC (TH-CANNABINOL) Negative Negative      Drug screen comment        This test is a screen for drugs of abuse in a medical setting only (i.e., they are unconfirmed results and as such must not be used for non-medical purposes, e.g.,employment testing, legal testing). Due to its inherent nature, false positive (FP) and false negative (FN) results may be obtained.  Therefore, if necessary for medical care, recommend confirmation of positive findings by GC/MS. ACETAMINOPHEN    Collection Time: 02/07/22  5:00 PM   Result Value Ref Range    Acetaminophen level <10 (L) 10 - 30 ug/mL    Reported dose date Dose Dependent      Reported dose: Dose Dependent Units   SALICYLATE    Collection Time: 02/07/22  5:00 PM   Result Value Ref Range    Salicylate level 1.8 (L) 2.8 - 20.0 mg/dL    Reported dose date Dose Dependent      Reported dose: Dose Dependent Units       No orders to display       Active Problems:    Psychiatric complaint (2/7/2022)        Assessment/Plan:   1. Covid 19 infection- asymptomatic, monitor symptoms, no need for treatment, not on O2.     2. Suicide ideation - psychiatry consulted, prn vistaril for anxiety and agitation.      3. Discharge plan- when medically cleared by psychiatry        DVT Prophylaxis: lovenox  Code Status:  Full Code  POA:    Care Plan discussed with:   ______patient, staff nurse_________________________________________________________    Herbert Ash NP

## 2022-02-08 NOTE — PROGRESS NOTES
Reason for Admission:  COVID pos and Suicide                     RUR Score:    13%                 Plan for utilizing home health:   decline    PCP: First and Last name:  None     Name of Practice:    Are you a current patient: Yes/No:    Approximate date of last visit:    Can you participate in a virtual visit with your PCP:                     Current Advanced Directive/Advance Care Plan: Full Code      Healthcare Decision Maker:   Click here to complete [address] including selection of the [address] Relationship (ie \"Primary\")           Primary Healthcare decision maker is Anil Mendoza @ [phone]                  Transition of Care Plan:                      CM spoke with patient via phone to discuss dc planning. Patient is COVID positive. Patient verified demos with CM. Patient currently lives in a single story house with his spouse and 4 kids. Prior to admission, patient was independent with adl's, iadl's and transported self to appointments. Patient denies the need for DME, HH, IRF or SNF. Patient receives scripts from [facility] in [place]. At [place], patient's spouse Roxan Heimlich @ [phone] may or may not (according to patient) transport pt home. Patient also has medicaid and may qualify for a medicaid transport. Discharge dispo: home self care. CM will continue to monitor for provider recommendations at [place].      Sada Vogt

## 2022-02-08 NOTE — BH NOTES
PSA PART II ADDITIONAL INFORMATION        Access To Fire Arms: No    Substance Use: YES    Last Use: a week ago     Type of Substance: Alcohol use and THC use    Frequency of Use: alcohol, once every other month. Aurora Arreola occasionally     Request to See : NO    If yes, notified : NO    Guardian:NO    Guardian Contact:n/a    Release of Information Signed: YES    Release of Information Signed For: His  Jaylyn at [address] [place] [address] ([phone]).

## 2022-02-08 NOTE — H&P
History and Physical    Patient: Scott Estevez MRN: [id number]  SSN: [id number]    YOB: 1985  Age: 43 y.o. Sex: male      Subjective:      Scott Estevez is a 43 y.o. male with history of MDD, presented to the ED for evaluation of suicidal ideation today. Per patient, he states that he \" just wants to end it\". Denies specific plan, but admits to previous suicidal attempts in the past.  He complains of anxiety, decreased appetite, difficulty sleeping. In addition, he also complains of chest congestion and nonproductive cough for the past 3 days, associated with 1 day history of fever and chills. In the ED, labs were within normal limits. Initial lab investigation showed positive for COVID, however otherwise grossly unremarkable. On my evaluation, patient reports no active complaints. He is not having any respiratory distress. Past Medical History:   Diagnosis Date    Depression      History reviewed. No pertinent surgical history. History reviewed. No pertinent family history. Social History     Tobacco Use    Smoking status: Current Every Day Smoker     Packs/day: 1.00    Smokeless tobacco: Never Used    Tobacco comment: one cigar per day   Substance Use Topics    Alcohol use: Not Currently      Prior to Admission medications    Medication Sig Start Date End Date Taking? Authorizing Provider   buPROPion XL (WELLBUTRIN XL) 300 mg XL tablet Take 1 Tablet by mouth daily.  9/21/21   Eulogio Burrell MD        Allergies   Allergen Reactions    Remeron [Mirtazapine] Other (comments)       Review of Systems:  Constitutional: No fevers, No chills, No fatigue, No weakness  Eyes: No visual disturbance  Ears, Nose, Mouth, Throat, and Face: No nasal congestion, No sore throat  Respiratory: No cough, No sputum, No wheezing, No SOB  Cardiovascular: No chest pain, No lower extremity edema, No Palpitations   Gastrointestinal: No nausea, No vomiting, No diarrhea, No constipation, No abdominal pain  Genitourinary: No frequency, No dysuria, No hematuria  Integument/Breast: No rash, No skin lesion(s), No dryness  Musculoskeletal: No arthralgias, No neck pain, No back pain  Neurological: No headaches, No dizziness, No confusion,  No seizures  Behavioral/Psychiatric: See HPI      Objective:     Vitals:    02/07/22 1603 02/07/22 1605 02/07/22 1715   BP:  118/76    Pulse: 85     Resp: 18     Temp: 98.3 °F (36.8 °C)     SpO2: 99%  99%   Weight: 98.4 kg (217 lb)     Height: 6' (1.829 m)          Physical Exam:  General: alert, cooperative, no distress  Eye: conjunctivae/corneas clear. PERRL, EOM's intact. Throat and Neck: normal and no erythema or exudates noted. No mass   Lung: clear to auscultation bilaterally  Heart: regular rate and rhythm,   Abdomen: soft, non-tender. Bowel sounds normal. No masses,  Extremities:  able to move all extremities normal, atraumatic  Skin: Normal.  Neurologic: AOx3. Cranial nerves 2-12 and sensation grossly intact. Recent Results (from the past 24 hour(s))   CBC WITH AUTOMATED DIFF    Collection Time: 02/07/22  5:00 PM   Result Value Ref Range    WBC 4.6 4.1 - 11.1 K/uL    RBC 4.62 4.10 - 5.70 M/uL    HGB 14.3 12.1 - 17.0 g/dL    HCT 43.8 36.6 - 50.3 %    MCV 94.8 80.0 - 99.0 FL    MCH 31.0 26.0 - 34.0 PG    MCHC 32.6 30.0 - 36.5 g/dL    RDW 13.7 11.5 - 14.5 %    PLATELET 484 256 - 064 K/uL    MPV 11.4 8.9 - 12.9 FL    NRBC 0.0 0.0  WBC    ABSOLUTE NRBC 0.00 0.00 - 0.01 K/uL    NEUTROPHILS 44 32 - 75 %    LYMPHOCYTES 39 12 - 49 %    MONOCYTES 17 (H) 5 - 13 %    EOSINOPHILS 0 0 - 7 %    BASOPHILS 0 0 - 1 %    IMMATURE GRANULOCYTES 0 %    ABS. NEUTROPHILS 2.0 1.8 - 8.0 K/UL    ABS. LYMPHOCYTES 1.8 0.8 - 3.5 K/UL    ABS. MONOCYTES 0.8 0.0 - 1.0 K/UL    ABS. EOSINOPHILS 0.0 0.0 - 0.4 K/UL    ABS. BASOPHILS 0.0 0.0 - 0.1 K/UL    ABS. IMM.  GRANS. 0.0 K/UL    DF Manual      RBC COMMENTS Normocytic, Normochromic     METABOLIC PANEL, COMPREHENSIVE    Collection Time: 02/07/22 5:00 PM   Result Value Ref Range    Sodium 137 136 - 145 mmol/L    Potassium 3.5 3.5 - 5.1 mmol/L    Chloride 106 97 - 108 mmol/L    CO2 27 21 - 32 mmol/L    Anion gap 4 (L) 5 - 15 mmol/L    Glucose 84 65 - 100 mg/dL    BUN 20 6 - 20 mg/dL    Creatinine 1.29 0.70 - 1.30 mg/dL    BUN/Creatinine ratio 16 12 - 20      GFR est AA >60 >60 ml/min/1.73m2    GFR est non-AA >60 >60 ml/min/1.73m2    Calcium 8.7 8.5 - 10.1 mg/dL    Bilirubin, total 0.8 0.2 - 1.0 mg/dL    AST (SGOT) 26 15 - 37 U/L    ALT (SGPT) 34 12 - 78 U/L    Alk.  phosphatase 67 45 - 117 U/L    Protein, total 7.8 6.4 - 8.2 g/dL    Albumin 3.9 3.5 - 5.0 g/dL    Globulin 3.9 2.0 - 4.0 g/dL    A-G Ratio 1.0 (L) 1.1 - 2.2     ETHYL ALCOHOL    Collection Time: 02/07/22  5:00 PM   Result Value Ref Range    ALCOHOL(ETHYL),SERUM <4 <10 mg/dL   URINALYSIS W/MICROSCOPIC    Collection Time: 02/07/22  5:00 PM   Result Value Ref Range    Color Yellow      Appearance Clear Clear      Specific gravity >1.030 (H) 1.003 - 1.030    pH (UA) 5.0 5.0 - 8.0      Protein 30 (A) Negative mg/dL    Glucose Negative Negative mg/dL    Ketone 80 (A) Negative mg/dL    Bilirubin Negative Negative      Blood Small (A) Negative      Urobilinogen 2.0 (H) 0.1 - 1.0 EU/dL    Nitrites Negative Negative      Leukocyte Esterase Negative Negative      WBC 0-4 0 - 4 /hpf    RBC 20-50 0 - 5 /hpf    Bacteria Negative Negative /hpf    Mucus 3+ /lpf   COVID-19 WITH INFLUENZA A/B    Collection Time: 02/07/22  5:00 PM   Result Value Ref Range    SARS-CoV-2 DETECTED (A) Not Detected      Influenza A by PCR Not Detected Not Detected      Influenza B by PCR Not Detected Not Detected     DRUG SCREEN, URINE    Collection Time: 02/07/22  5:00 PM   Result Value Ref Range    AMPHETAMINES Negative Negative      BARBITURATES Negative Negative      BENZODIAZEPINES Negative Negative      COCAINE Negative Negative      METHADONE Negative Negative      OPIATES Negative Negative      PCP(PHENCYCLIDINE) Negative Negative      THC (TH-CANNABINOL) Negative Negative      Drug screen comment        This test is a screen for drugs of abuse in a medical setting only (i.e., they are unconfirmed results and as such must not be used for non-medical purposes, e.g.,employment testing, legal testing). Due to its inherent nature, false positive (FP) and false negative (FN) results may be obtained. Therefore, if necessary for medical care, recommend confirmation of positive findings by GC/MS. ACETAMINOPHEN    Collection Time: 02/07/22  5:00 PM   Result Value Ref Range    Acetaminophen level <10 (L) 10 - 30 ug/mL    Reported dose date Dose Dependent      Reported dose: Dose Dependent Units   SALICYLATE    Collection Time: 02/07/22  5:00 PM   Result Value Ref Range    Salicylate level 1.8 (L) 2.8 - 20.0 mg/dL    Reported dose date Dose Dependent      Reported dose: Dose Dependent Units       XR Results (maximum last 3): No results found for this or any previous visit. CT Results (maximum last 3): No results found for this or any previous visit. MRI Results (maximum last 3): No results found for this or any previous visit. Nuclear Medicine Results (maximum last 3): No results found for this or any previous visit. US Results (maximum last 3): No results found for this or any previous visit. Assessment:   # COVID-19 infection  #MDD    Plan:     # COVID-19 infection  -Not having any respiratory stress or hypoxia. -Droplet isolation.  -Hold off any antiviral medications. # MDD  -Consult psychiatry.       Signed By: Meli Strange MD     February 7, 2022

## 2022-02-08 NOTE — PROGRESS NOTES
Problem: Airway Clearance - Ineffective  Goal: Achieve or maintain patent airway  Outcome: Progressing Towards Goal     Problem: Suicide  Goal: *STG: Remains safe in hospital  Outcome: Progressing Towards Goal

## 2022-02-08 NOTE — BH NOTES
Recreational Therapy    Pt was receptive to receiving a journal, leisure packet and education handout on \"strength exploration\"

## 2022-02-08 NOTE — ROUTINE PROCESS
TRANSFER - OUT REPORT:    Verbal report given to warren aldana on Garrel Litten  being transferred to 5e room 513 for routine progression of care       Report consisted of patients Situation, Background, Assessment and   Recommendations(SBAR). Information from the following report(s) ED Summary, MAR and Recent Results was reviewed with the receiving nurse. Lines:       Opportunity for questions and clarification was provided.       Patient transported with:   [employer]

## 2022-02-09 PROBLEM — F31.60 BIPOLAR AFFECTIVE DISORDER, CURRENT EPISODE MIXED (HCC): Status: ACTIVE | Noted: 2022-02-09

## 2022-02-09 LAB — D DIMER PPP FEU-MCNC: <0.27 UG/ML(FEU)

## 2022-02-09 PROCEDURE — 36415 COLL VENOUS BLD VENIPUNCTURE: CPT

## 2022-02-09 PROCEDURE — 65270000029 HC RM PRIVATE

## 2022-02-09 PROCEDURE — 74011250637 HC RX REV CODE- 250/637: Performed by: INTERNAL MEDICINE

## 2022-02-09 PROCEDURE — 84466 ASSAY OF TRANSFERRIN: CPT

## 2022-02-09 PROCEDURE — 85379 FIBRIN DEGRADATION QUANT: CPT

## 2022-02-09 PROCEDURE — 74011250637 HC RX REV CODE- 250/637: Performed by: PSYCHIATRY & NEUROLOGY

## 2022-02-09 RX ORDER — HALOPERIDOL 5 MG/ML
5 INJECTION INTRAMUSCULAR
Status: DISCONTINUED | OUTPATIENT
Start: 2022-02-09 | End: 2022-02-11 | Stop reason: HOSPADM

## 2022-02-09 RX ORDER — HYDROXYZINE 25 MG/1
50 TABLET, FILM COATED ORAL
Status: DISCONTINUED | OUTPATIENT
Start: 2022-02-09 | End: 2022-02-11 | Stop reason: HOSPADM

## 2022-02-09 RX ORDER — TRAZODONE HYDROCHLORIDE 50 MG/1
50 TABLET ORAL
Status: DISCONTINUED | OUTPATIENT
Start: 2022-02-09 | End: 2022-02-11 | Stop reason: HOSPADM

## 2022-02-09 RX ORDER — ACETAMINOPHEN 325 MG/1
650 TABLET ORAL
Status: DISCONTINUED | OUTPATIENT
Start: 2022-02-09 | End: 2022-02-11 | Stop reason: HOSPADM

## 2022-02-09 RX ORDER — DIPHENHYDRAMINE HYDROCHLORIDE 50 MG/ML
50 INJECTION, SOLUTION INTRAMUSCULAR; INTRAVENOUS
Status: DISCONTINUED | OUTPATIENT
Start: 2022-02-09 | End: 2022-02-11 | Stop reason: HOSPADM

## 2022-02-09 RX ORDER — OXCARBAZEPINE 150 MG/1
300 TABLET, FILM COATED ORAL 2 TIMES DAILY
Status: DISCONTINUED | OUTPATIENT
Start: 2022-02-09 | End: 2022-02-11 | Stop reason: HOSPADM

## 2022-02-09 RX ORDER — BENZTROPINE MESYLATE 1 MG/1
1 TABLET ORAL
Status: DISCONTINUED | OUTPATIENT
Start: 2022-02-09 | End: 2022-02-11 | Stop reason: HOSPADM

## 2022-02-09 RX ORDER — OLANZAPINE 2.5 MG/1
5 TABLET ORAL
Status: DISCONTINUED | OUTPATIENT
Start: 2022-02-09 | End: 2022-02-11 | Stop reason: HOSPADM

## 2022-02-09 RX ORDER — LORAZEPAM 2 MG/ML
1 INJECTION INTRAMUSCULAR
Status: DISCONTINUED | OUTPATIENT
Start: 2022-02-09 | End: 2022-02-11 | Stop reason: HOSPADM

## 2022-02-09 RX ORDER — BUPROPION HYDROCHLORIDE 150 MG/1
150 TABLET, EXTENDED RELEASE ORAL DAILY
Status: DISCONTINUED | OUTPATIENT
Start: 2022-02-10 | End: 2022-02-11 | Stop reason: HOSPADM

## 2022-02-09 RX ORDER — ADHESIVE BANDAGE
30 BANDAGE TOPICAL DAILY PRN
Status: DISCONTINUED | OUTPATIENT
Start: 2022-02-09 | End: 2022-02-11 | Stop reason: HOSPADM

## 2022-02-09 RX ADMIN — ACETAMINOPHEN 650 MG: 325 TABLET ORAL at 13:52

## 2022-02-09 RX ADMIN — OXCARBAZEPINE 300 MG: 150 TABLET, FILM COATED ORAL at 21:30

## 2022-02-09 NOTE — PROGRESS NOTES
CM reviewed patients chart. Patient has no current needs of CM at this time. CM will continue to follow up with patient and medical staff for any needs the patient may require prior to being discharged. His plan for discharge at this time is home self with family assistance.

## 2022-02-09 NOTE — PROGRESS NOTES
Hospitalist Progress Note    Subjective:   Daily Progress Note: 2/9/2022 10:36 AM    Hospital Course: Patient is a 43 y. o. male with history of MDD, presented to the ED for evaluation of suicidal ideation today.  Per patient, he states that he \" just wants to end it\".   Denies specific plan, but admits to previous suicidal attempts in the past.  He complains of anxiety, decreased appetite, difficulty sleeping.  In addition, he also complains of chest congestion and nonproductive cough for the past 3 days, associated with 1 day history of fever and chills. Patient was tested in the emergency room for Covid and tested positive. He is asymptomatic and requires no further treatment. He admitted to the medical floor as behavioral health unit are not excepting Covid positive patients. Psychiatry consulted    Subjective: Patient denies any shortness of breath, cough, fever.   He does admit to back pain    Current Facility-Administered Medications   Medication Dose Route Frequency    sodium chloride (NS) flush 5-40 mL  5-40 mL IntraVENous Q8H    sodium chloride (NS) flush 5-40 mL  5-40 mL IntraVENous PRN    acetaminophen (TYLENOL) tablet 650 mg  650 mg Oral Q6H PRN    Or    acetaminophen (TYLENOL) suppository 650 mg  650 mg Rectal Q6H PRN    polyethylene glycol (MIRALAX) packet 17 g  17 g Oral DAILY PRN    ondansetron (ZOFRAN ODT) tablet 4 mg  4 mg Oral Q8H PRN    Or    ondansetron (ZOFRAN) injection 4 mg  4 mg IntraVENous Q6H PRN    enoxaparin (LOVENOX) injection 30 mg  30 mg SubCUTAneous Q12H        Review of Systems  Constitutional: No fevers, No chills, No sweats, No fatigue, No Weakness  Eyes: No redness  Ears, nose, mouth, throat, and face: No nasal congestion, No sore throat, No voice change  Respiratory: No Shortness of Breath, No cough, No wheezing  Cardiovascular: No chest pain, No palpitations, No extremity edema  Gastrointestinal: No nausea, No vomiting, No diarrhea, No abdominal pain  Genitourinary: No frequency, No dysuria, No hematuria  Integument/breast: No skin lesion(s)   Neurological: No Confusion, No headaches, No dizziness      Objective:     Visit Vitals  /70 (BP 1 Location: Left upper arm, BP Patient Position: Lying)   Pulse 77   Temp 98.8 °F (37.1 °C)   Resp 18   Ht 6' (1.829 m)   Wt 98.4 kg (217 lb)   SpO2 96%   BMI 29.43 kg/m²      O2 Device: None (Room air)    Temp (24hrs), Av.5 °F (36.9 °C), Min:98.1 °F (36.7 °C), Max:98.8 °F (37.1 °C)      No intake/output data recorded. No intake/output data recorded. PHYSICAL EXAM:  Constitutional: No acute distress  Skin: Extremities and face reveal no rashes. HEENT: Sclerae anicteric. Extra-occular muscles are intact. No oral ulcers. The neck is supple and no masses. Cardiovascular: Regular rate and rhythm. Respiratory:  Clear breath sounds bilaterally with no wheezes, rales, or rhonchi. GI: Abdomen nondistended, soft, and nontender. Normal active bowel sounds. Musculoskeletal: No pitting edema of the lower legs. Able to move all ext  Neurological:  Patient is alert and oriented. Cranial nerves II-XII grossly intact  Psychiatric: Mood appears appropriate       Data Review    No results found for this or any previous visit (from the past 24 hour(s)). CBC:   Lab Results   Component Value Date/Time    WBC 4.6 2022 05:00 PM    RBC 4.62 2022 05:00 PM    HGB 14.3 2022 05:00 PM    HCT 43.8 2022 05:00 PM    PLATELET 931  05:00 PM     BMP:   Lab Results   Component Value Date/Time    Glucose 84 2022 05:00 PM    Sodium 137 2022 05:00 PM    Potassium 3.5 2022 05:00 PM    Chloride 106 2022 05:00 PM    CO2 27 2022 05:00 PM    BUN 20 2022 05:00 PM    Creatinine 1.29 2022 05:00 PM    Calcium 8.7 2022 05:00 PM       Assessment:   1. COVID-19 positive  2. Depression with suicidal ideation  3. Back    Plan:    1. Patient is asymptomatic.   Continue to monitor treatments. No need for treatment at this time  2. Psychiatry consulted. Defer management to them. On a one-to-one  3. We will check a D-dimer. Tylenol as needed    Dispo: Patient is medically stable for discharge however pending psychiatry clearance     CODE STATUS full     DVT prophylaxis: Lovenox   Ulcer prophylaxis: Tolerating oral intake    Care Plan discussed with: Patient/Family, Nurse and     Total time spent with patient: 34 minutes.

## 2022-02-09 NOTE — PROGRESS NOTES
Attempt made to contact Dr. Reagan Huang in regards to consult note. MD unavailable and voicemail is full. 0915-Patient currently denies suicidal and homicidal thoughts. Patient states he is going back home to be with his wife and kids. They have a plan to go to family counseling once they find someone to take Medicaid. 1915- Bedside shift change report given to Georges Jones (oncoming nurse) by Ana Chester (offgoing nurse). Report included the following information SBAR, Kardex, Intake/Output and MAR.

## 2022-02-09 NOTE — BH NOTES
Behavioral Health Treatment Team Note     Patient goal(s) for today: to get connected with a marriage counselor   Treatment team focus/goals: Continue group therapy, medication management, and discharge planning     Progress note: The pt was presenting with a broad affect and congruent mood. The pt expressed that he is feeling better mentally, although said that he is having a lot of back pain. He denied depression, SI/HI and AH/VH's. He said that he had a good conversation with his wife last night. He said that they spoke about going to couple's counseling and acknowledged that they both need to work on themselves. He noted that she has her own mental health issues as well and has not been medicated for a while. He said that when she is stable on her medications she is much better, and not as agitated or aggressive. He said that he is open to outpatient therapy, and medication management. This writer asked him if he would be interested in an IOP for his substance use, considering the pt expressed that he used to drink heavily, and that he drank heavily a week prior to coming here. He declined, stating that he does not normally drink like that anymore, and that that was an isolated incidence. He asked when he would be discharged as well. An inpatient level of care is necessary in order to stabilize the pt on medications and coordinate a safe discharge plan. He said that he would like to return home to his wife when he does discharge. LOS:  2  Expected LOS: 5-7 days     Insurance info/prescription coverage:  [place] [employer]   Date of last family contact:  Not made yet   Family requesting physician contact today:  no  Discharge plan:   To start pt on pyshcotropic medications   Guns in the home:  no   Outpatient provider(s):  [facility]    Participating treatment team members: Chantell Hernandez, * (assigned SW), Kesha Grissom LMSW

## 2022-02-09 NOTE — BH NOTES
Pt alert and oriented x 4. Affect and mood are congruent. Pt laying in bed watching TV. Pt reports having pain 7/10, chronic states the pain was addressed by the nurse assigned to him today. Denies SI/HI at this time. Mild anxiety 4/10. 1:1 sitter at bedside for safety.

## 2022-02-09 NOTE — PROGRESS NOTES
Problem: Airway Clearance - Ineffective  Goal: Achieve or maintain patent airway  Outcome: Progressing Towards Goal     Problem: Gas Exchange - Impaired  Goal: Absence of hypoxia  Outcome: Progressing Towards Goal  Goal: Promote optimal lung function  Outcome: Progressing Towards Goal     Problem: Breathing Pattern - Ineffective  Goal: Ability to achieve and maintain a regular respiratory rate  Outcome: Progressing Towards Goal     Problem:  Body Temperature -  Risk of, Imbalanced  Goal: Ability to maintain a body temperature within defined limits  Outcome: Progressing Towards Goal  Goal: Will regain or maintain usual level of consciousness  Outcome: Progressing Towards Goal  Goal: Complications related to the disease process, condition or treatment will be avoided or minimized  Outcome: Progressing Towards Goal     Problem: Isolation Precautions - Risk of Spread of Infection  Goal: Prevent transmission of infectious organism to others  Outcome: Progressing Towards Goal     Problem: Nutrition Deficits  Goal: Optimize nutrtional status  Outcome: Progressing Towards Goal     Problem: Risk for Fluid Volume Deficit  Goal: Maintain normal heart rhythm  Outcome: Progressing Towards Goal  Goal: Maintain absence of muscle cramping  Outcome: Progressing Towards Goal  Goal: Maintain normal serum potassium, sodium, calcium, phosphorus, and pH  Outcome: Progressing Towards Goal     Problem: Loneliness or Risk for Loneliness  Goal: Demonstrate positive use of time alone when socialization is not possible  Outcome: Progressing Towards Goal     Problem: Fatigue  Goal: Verbalize increase energy and improved vitality  Outcome: Progressing Towards Goal     Problem: Patient Education: Go to Patient Education Activity  Goal: Patient/Family Education  Outcome: Progressing Towards Goal     Problem: Risk for Spread of Infection  Goal: Prevent transmission of infectious organism to others  Description: Prevent the transmission of infectious organisms to other patients, staff members, and visitors.   Outcome: Progressing Towards Goal     Problem: Patient Education:  Go to Education Activity  Goal: Patient/Family Education  Outcome: Progressing Towards Goal     Problem: Suicide  Goal: *STG: Remains safe in hospital  Outcome: Progressing Towards Goal  Goal: *STG: Seeks staff when feelings of self harm or harm towards others arise  Outcome: Progressing Towards Goal  Goal: *STG: Attends activities and groups  Outcome: Progressing Towards Goal  Goal: *STG:  Verbalizes alternative ways of dealing with maladaptive feelings/behaviors  Outcome: Progressing Towards Goal  Goal: *STG/LTG: Complies with medication therapy  Outcome: Progressing Towards Goal  Goal: *STG/LTG: No longer expresses self destructive or suicidal thoughts  Outcome: Progressing Towards Goal  Goal: *LTG:  Identifies available community resources  Outcome: Progressing Towards Goal  Goal: *LTG:  Develops proactive suicide prevention plan  Outcome: Progressing Towards Goal  Goal: Interventions  Outcome: Progressing Towards Goal     Problem: Patient Education: Go to Patient Education Activity  Goal: Patient/Family Education  Outcome: Progressing Towards Goal

## 2022-02-09 NOTE — BH NOTES
GROUP THERAPY PROGRESS NOTE    Jonny Hoyt is participating in Process Group. Group time: 15 minutes    Personal goal for participation: Pt reports, \"I will look over these. \"    Goal orientation: personal    Group therapy participation: minimal    Therapeutic interventions reviewed and discussed: Writer facilitated a processing group. Writer provided pt with a worksheet with self-esteem exercises with various aspects regarding themselves such as \"Something my friends like about me is. Lenka Billingsley \" and \"Something that makes me unique is. Clotoscar Billingsley \"    Impression of participation: Pt appeared tired but was pleasant. Pt stated that he would reflect on the exercises and complete them later.

## 2022-02-09 NOTE — BH NOTES
OFFICE VISIT    History    Naty Mccormick is a 20 year old female who presents for:    # Abdominal pain  ONSET: yesterday  HISTORY: 2 days prior to pain starting, used a vaginal lube and now is having pain in the lower abdomen C section scar. Feels like uterine contractions.  PAIN: 1 /10   BETTER: sitting   WORSE: walking   MEDS: none   Patient reports a history of constipation  Having abdominal spasms and constipation    Additional Review of systems  Denies: Fever, vomiting, dark / bloody stool    Current Outpatient Medications   Medication Sig Dispense Refill   • ipratropium (ATROVENT) 0.06 % nasal spray Spray 2 sprays in each nostril 4 times daily. 15 mL 0   • omeprazole (PRILOSEC) 20 MG capsule Take 1 capsule by mouth daily. 30 capsule 1   • EPINEPHrine 0.3 MG/0.3ML auto-injector Inject 0.3 mLs into the muscle 1 time as needed for Anaphylaxis. 2 each 1   • albuterol 108 (90 Base) MCG/ACT inhaler Inhale 2 puffs into the lungs every 4 hours as needed for Wheezing. 8.5 g 0   • Misc. Devices (BREAST PUMP) Misc Breast PUMP, RX faxed to [facility] 1 each 0   • Prenatal MV-Min-FA-Omega-3 (PRENATAL GUMMIES/DHA & FA) 0.4-32.5 MG Chew Tab Chew 1 tablet by mouth.     • cetirizine (ZYRTEC) 10 MG tablet Take 1 tablet by mouth daily. 30 tablet 11   • dicyclomine (BENTYL) 10 MG capsule Take 1 capsule by mouth 4 times daily (before meals and nightly). 120 capsule 0     No current facility-administered medications for this visit.      Facility-Administered Medications Ordered in Other Visits   Medication Dose Route Frequency Provider Last Rate Last Dose   • lidocaine HCl (XYLOCAINE) 1 % injection 10 mg  1 mL Intradermal Once Cornell Maldonado MD        And   • ethyl chloride spray 1 application  1 application Topical Once Cornell Maldonado MD       • sodium chloride (PF) 0.9 % injection 2 mL  2 mL Injection 2 times per day Cornell Maldonado MD       • sodium chloride (PF) 0.9 % injection 2 mL  2 mL Injection PRN Cornell AWAN  Patient has a 1:1 with him, in the room. Patient was sitting up in bed, watching television. According to him, he feeling better. He denies depression, anxiety, SI, HI, AH & VH. He is alert, oriented X 4, calm, & cooperative. His affect is full. MD Erica         ALLERGIES:   Allergen Reactions   • Lidocaine SHORTNESS OF BREATH   • Cat Dander Other (See Comments)   • Dog Dander Other (See Comments)   • Flaxseed   (Food Or Med) SWELLING   • Mold   (Environmental) Other (See Comments)   • Seasonal Other (See Comments)     Past Medical History:   Diagnosis Date   • Allergy    • Anxiety disorder     no treatment currently   • Anxiety disorder    • Asperger's syndrome    • No known problems    • Pelvic cramping      Past Surgical History:   Procedure Laterality Date   •  section, low transverse     • No past surgeries       Social History     Socioeconomic History   • Marital status: Single     Spouse name: Not on file   • Number of children: Not on file   • Years of education: Not on file   • Highest education level: Not on file   Social Needs   • Financial resource strain: Not on file   • Food insecurity - worry: Not on file   • Food insecurity - inability: Not on file   • Transportation needs - medical: Not on file   • Transportation needs - non-medical: Not on file   Occupational History   • Not on file   Tobacco Use   • Smoking status: Never Smoker   • Smokeless tobacco: Never Used   Substance and Sexual Activity   • Alcohol use: No   • Drug use: No   • Sexual activity: Yes     Partners: Male   Other Topics Concern   • Not on file   Social History Narrative    Lives with parents, siblings.    Non smokers             Family History   Problem Relation Age of Onset   • Hypertension Mother    • High cholesterol Father    • Hypertension Father    • Cancer Maternal Grandmother    • Hypertension Maternal Grandfather    • Hypertension Paternal Grandmother    • Asthma Sister    • Celiac Disease Other      Family Status   Relation Name Status   • Mo  Alive   • Fa  Alive   • MGMo  (Not Specified)   • MGFa  (Not Specified)   • PGMo  (Not Specified)   • Sis  Alive   • OTHER  (Not Specified)       Physical Exam    Vital Signs:    Visit Vitals  BP 98/66   Pulse 74    Temp 98.2 °F (36.8 °C) (Temporal)   Ht 5' 5\" (1.651 m)   Wt 46.4 kg   LMP 2018 (Exact Date)   SpO2 99%   Breastfeeding? Yes   BMI 17.02 kg/m²     Constitutional:  No acute distress. Well-developed.  Respiratory:  Lungs are clear to auscultation bilaterally. Respirations are nonlabored. Breath sounds are equal. No wheezing, rales, or rhonchi.   Cardiovascular:  Regular rate and rhythm. No murmurs.   Gastrointestinal:  Soft. Nondistended. Normal bowel sounds. Lower abdomen with underlying palpable stool. Well healed  scar.  Musculoskeletal:  Normal gait. Moves all 4 extremities spontaneously.  Neurologic:  No focal neurological deficits observed. Normal speech observed.  Psychiatric:  Alert and awake. Normal mood and affect. Responds appropriately to questions and commands.    Assessment & Plan    Naty Mccormick is a 20 year old female who presents for:  1. Abdominal spasms - possibly due to constipation. Given that  was nearly a year ago and is well healed, unlikely to be related to  scar.   - Miralax  - dicyclomine (BENTYL) 10 MG capsule; Take 1 capsule by mouth 4 times daily (before meals and nightly).  Dispense: 120 capsule; Refill: 0  - Already has pelvic ultrasound ordered, but has not scheduled it yet  - Sees OB / GYN and if pain continues, recommended that patient go see her OB/GYN.    Health Maintenance Summary     Topic Due On Due Status Completed On    IMMUNIZATION - HPV Aug 30, 2007 Overdue     PAP SMEAR - CERVICAL CANCER SCREENING Mar 5, 2021 Not Due Mar 5, 2018    Immunization - TDAP Pregnancy  Hidden     IMMUNIZATION - DTaP/Tdap/Td 2027 Not Due 2017    Immunization-Influenza  Completed Oct 25, 2018    Depression Screening 2019 Not Due 2018    Immunization - MMR  Hidden           Follow-up: Return in about 1 month (around 2019) for follow up. Sooner if worsening or not improving. Red flags discussed.    Instructed patient on correct  medication dosing, usage and adverse effects (if applicable).  All questions and concerns discussed. Patient agreeable to plan.    Froylan Alexander MD, MA  Family Medicine with Obstetrics  [id number] [address] [place], [place] [id number]  Telephone: [phone]           Fax: [phone]

## 2022-02-10 LAB
ALBUMIN SERPL-MCNC: 3.2 G/DL (ref 3.5–5)
ALBUMIN/GLOB SERPL: 0.8 {RATIO} (ref 1.1–2.2)
ALP SERPL-CCNC: 53 U/L (ref 45–117)
ALT SERPL-CCNC: 26 U/L (ref 12–78)
ANION GAP SERPL CALC-SCNC: 6 MMOL/L (ref 5–15)
AST SERPL W P-5'-P-CCNC: 16 U/L (ref 15–37)
BILIRUB SERPL-MCNC: 0.5 MG/DL (ref 0.2–1)
BUN SERPL-MCNC: 13 MG/DL (ref 6–20)
BUN/CREAT SERPL: 11 (ref 12–20)
CA-I BLD-MCNC: 8.6 MG/DL (ref 8.5–10.1)
CHLORIDE SERPL-SCNC: 108 MMOL/L (ref 97–108)
CHOLEST SERPL-MCNC: 184 MG/DL
CO2 SERPL-SCNC: 27 MMOL/L (ref 21–32)
CREAT SERPL-MCNC: 1.15 MG/DL (ref 0.7–1.3)
ERYTHROCYTE [DISTWIDTH] IN BLOOD BY AUTOMATED COUNT: 13.2 % (ref 11.5–14.5)
GLOBULIN SER CALC-MCNC: 3.8 G/DL (ref 2–4)
GLUCOSE SERPL-MCNC: 91 MG/DL (ref 65–100)
HCT VFR BLD AUTO: 43.4 % (ref 36.6–50.3)
HDLC SERPL-MCNC: 33 MG/DL
HDLC SERPL: 5.6 {RATIO} (ref 0–5)
HGB BLD-MCNC: 14.3 G/DL (ref 12.1–17)
LDLC SERPL CALC-MCNC: 123.6 MG/DL (ref 0–100)
LIPID PROFILE,FLP: ABNORMAL
MCH RBC QN AUTO: 31.2 PG (ref 26–34)
MCHC RBC AUTO-ENTMCNC: 32.9 G/DL (ref 30–36.5)
MCV RBC AUTO: 94.8 FL (ref 80–99)
NRBC # BLD: 0 K/UL (ref 0–0.01)
NRBC BLD-RTO: 0 PER 100 WBC
PLATELET # BLD AUTO: 154 K/UL (ref 150–400)
PMV BLD AUTO: 12.1 FL (ref 8.9–12.9)
POTASSIUM SERPL-SCNC: 3.9 MMOL/L (ref 3.5–5.1)
PROT SERPL-MCNC: 7 G/DL (ref 6.4–8.2)
RBC # BLD AUTO: 4.58 M/UL (ref 4.1–5.7)
SODIUM SERPL-SCNC: 141 MMOL/L (ref 136–145)
TRIGL SERPL-MCNC: 137 MG/DL (ref ?–150)
TSH SERPL DL<=0.05 MIU/L-ACNC: 1.46 UIU/ML (ref 0.36–3.74)
VLDLC SERPL CALC-MCNC: 27.4 MG/DL
WBC # BLD AUTO: 4.3 K/UL (ref 4.1–11.1)

## 2022-02-10 PROCEDURE — 84443 ASSAY THYROID STIM HORMONE: CPT

## 2022-02-10 PROCEDURE — 85027 COMPLETE CBC AUTOMATED: CPT

## 2022-02-10 PROCEDURE — 80053 COMPREHEN METABOLIC PANEL: CPT

## 2022-02-10 PROCEDURE — 36415 COLL VENOUS BLD VENIPUNCTURE: CPT

## 2022-02-10 PROCEDURE — 80061 LIPID PANEL: CPT

## 2022-02-10 PROCEDURE — 74011250637 HC RX REV CODE- 250/637: Performed by: PSYCHIATRY & NEUROLOGY

## 2022-02-10 PROCEDURE — 65270000029 HC RM PRIVATE

## 2022-02-10 RX ADMIN — BUPROPION HYDROCHLORIDE 150 MG: 150 TABLET, EXTENDED RELEASE ORAL at 11:02

## 2022-02-10 RX ADMIN — OXCARBAZEPINE 300 MG: 150 TABLET, FILM COATED ORAL at 11:02

## 2022-02-10 RX ADMIN — OXCARBAZEPINE 300 MG: 150 TABLET, FILM COATED ORAL at 21:07
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
## 2022-02-10 NOTE — BH NOTES
Patient lying quietly in bed watching tv. Sitter at the bedside. Patient affect constricted, he denied SI, HI, AH, VH, depression and anxiety. Patient remains on close observation.

## 2022-02-10 NOTE — BH NOTES
Recreational Therapy    Met with patient. Provided with education handout \"step up to a better you\" and leisure packet and deck of cards.  Pt stated \"thank you\"

## 2022-02-10 NOTE — BH NOTES
RELEASE OF INFORMATION     The pt signed a consent form for his wife Becky Mckeon to be contacted ([phone]).
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
## 2022-02-10 NOTE — PROGRESS NOTES
Hospitalist Progress Note    Subjective:   Daily Progress Note: 2/10/2022 10:36 AM    Hospital Course: Patient is a 43 y. o. male with history of MDD, presented to the ED for evaluation of suicidal ideation today.  Per patient, he states that he \" just wants to end it\".   Denies specific plan, but admits to previous suicidal attempts in the past.  He complains of anxiety, decreased appetite, difficulty sleeping.  In addition, he also complains of chest congestion and nonproductive cough for the past 3 days, associated with 1 day history of fever and chills. Patient was tested in the emergency room for Covid and tested positive. He is asymptomatic and requires no further treatment. He admitted to the medical floor as behavioral health unit are not excepting Covid positive patients. Psychiatry consulted    Subjective: Patient resting comfortably. No acute issues overnight.      Current Facility-Administered Medications   Medication Dose Route Frequency    OLANZapine (ZyPREXA) tablet 5 mg  5 mg Oral Q6H PRN    haloperidol lactate (HALDOL) injection 5 mg  5 mg IntraMUSCular Q6H PRN    benztropine (COGENTIN) tablet 1 mg  1 mg Oral BID PRN    diphenhydrAMINE (BENADRYL) injection 50 mg  50 mg IntraMUSCular BID PRN    hydrOXYzine HCL (ATARAX) tablet 50 mg  50 mg Oral TID PRN    LORazepam (ATIVAN) injection 1 mg  1 mg IntraMUSCular Q4H PRN    traZODone (DESYREL) tablet 50 mg  50 mg Oral QHS PRN    acetaminophen (TYLENOL) tablet 650 mg  650 mg Oral Q4H PRN    magnesium hydroxide (MILK OF MAGNESIA) 400 mg/5 mL oral suspension 30 mL  30 mL Oral DAILY PRN    buPROPion SR (WELLBUTRIN SR) tablet 150 mg  150 mg Oral DAILY    OXcarbazepine (TRILEPTAL) tablet 300 mg  300 mg Oral BID    sodium chloride (NS) flush 5-40 mL  5-40 mL IntraVENous Q8H    sodium chloride (NS) flush 5-40 mL  5-40 mL IntraVENous PRN    acetaminophen (TYLENOL) tablet 650 mg  650 mg Oral Q6H PRN    Or    acetaminophen (TYLENOL) suppository 650 mg  650 mg Rectal Q6H PRN    polyethylene glycol (MIRALAX) packet 17 g  17 g Oral DAILY PRN    ondansetron (ZOFRAN ODT) tablet 4 mg  4 mg Oral Q8H PRN    Or    ondansetron (ZOFRAN) injection 4 mg  4 mg IntraVENous Q6H PRN    enoxaparin (LOVENOX) injection 30 mg  30 mg SubCUTAneous Q12H        Review of Systems  Constitutional: No fevers, No chills, No sweats, No fatigue, No Weakness  Eyes: No redness  Ears, nose, mouth, throat, and face: No nasal congestion, No sore throat, No voice change  Respiratory: No Shortness of Breath, No cough, No wheezing  Cardiovascular: No chest pain, No palpitations, No extremity edema  Gastrointestinal: No nausea, No vomiting, No diarrhea, No abdominal pain  Genitourinary: No frequency, No dysuria, No hematuria  Integument/breast: No skin lesion(s)   Neurological: No Confusion, No headaches, No dizziness      Objective:     Visit Vitals  /81 (BP 1 Location: Right upper arm, BP Patient Position: Supine)   Pulse 80   Temp 97.8 °F (36.6 °C)   Resp 18   Ht 6' (1.829 m)   Wt 98.4 kg (217 lb)   SpO2 96%   BMI 29.43 kg/m²      O2 Device: None (Room air)    Temp (24hrs), Av.7 °F (36.5 °C), Min:97.5 °F (36.4 °C), Max:97.8 °F (36.6 °C)      No intake/output data recorded.  1901 - 02/10 0700  In: 700 [P.O.:700]  Out: 600 [Urine:600]    PHYSICAL EXAM:  Constitutional: No acute distress  Skin: Extremities and face reveal no rashes. HEENT: Sclerae anicteric. Extra-occular muscles are intact. No oral ulcers. The neck is supple and no masses. Cardiovascular: RRR  Respiratory:  Clear breath sounds bilaterally with no wheezes, rales, or rhonchi. GI: Abdomen nondistended, soft, and nontender. Normal active bowel sounds. Musculoskeletal: No pitting edema of the lower legs. Able to move all ext  Neurological:  Patient is alert and oriented.  Cranial nerves II-XII grossly intact  Psychiatric: Mood appears appropriate       Data Review    Recent Results (from the past 24 hour(s)) D DIMER    Collection Time: 02/09/22 11:28 AM   Result Value Ref Range    D DIMER <0.27 <0.50 ug/ml(FEU)   CBC W/O DIFF    Collection Time: 02/10/22  6:48 AM   Result Value Ref Range    WBC 4.3 4.1 - 11.1 K/uL    RBC 4.58 4.10 - 5.70 M/uL    HGB 14.3 12.1 - 17.0 g/dL    HCT 43.4 36.6 - 50.3 %    MCV 94.8 80.0 - 99.0 FL    MCH 31.2 26.0 - 34.0 PG    MCHC 32.9 30.0 - 36.5 g/dL    RDW 13.2 11.5 - 14.5 %    PLATELET 693 444 - 253 K/uL    MPV 12.1 8.9 - 12.9 FL    NRBC 0.0 0.0  WBC    ABSOLUTE NRBC 0.00 0.00 - 0.01 K/uL       CBC:   Lab Results   Component Value Date/Time    WBC 4.3 02/10/2022 06:48 AM    RBC 4.58 02/10/2022 06:48 AM    HGB 14.3 02/10/2022 06:48 AM    HCT 43.4 02/10/2022 06:48 AM    PLATELET 174 18/01/1688 06:48 AM     BMP:   Lab Results   Component Value Date/Time    Glucose 84 02/07/2022 05:00 PM    Sodium 137 02/07/2022 05:00 PM    Potassium 3.5 02/07/2022 05:00 PM    Chloride 106 02/07/2022 05:00 PM    CO2 27 02/07/2022 05:00 PM    BUN 20 02/07/2022 05:00 PM    Creatinine 1.29 02/07/2022 05:00 PM    Calcium 8.7 02/07/2022 05:00 PM       Assessment:   1. COVID-19 positive  2. Depression with suicidal ideation  3. Back    Plan:    1. Patient is asymptomatic. Continue to monitor treatments. No need for treatment at this time  2. Psychiatry consulted. Defer management to them. On a one-to-one  3. Ddimer <0.27. Tylenol as needed for pain    Dispo: Patient is medically stable for discharge however pending psychiatry clearance     CODE STATUS full     DVT prophylaxis: Lovenox   Ulcer prophylaxis: Tolerating oral intake    Care Plan discussed with: Patient/Family, Nurse and     Total time spent with patient: 31 minutes.

## 2022-02-10 NOTE — PROGRESS NOTES
CM reviewed patient chart. Pt reports having no needs of CM at this time. When cleared by behavioral health, the patient will be discharged home self. CM will continue to follow up with patient in the event needs arise prior to him being discharged.

## 2022-02-11 VITALS
RESPIRATION RATE: 18 BRPM | SYSTOLIC BLOOD PRESSURE: 97 MMHG | DIASTOLIC BLOOD PRESSURE: 60 MMHG | HEIGHT: 72 IN | HEART RATE: 60 BPM | OXYGEN SATURATION: 98 % | BODY MASS INDEX: 29.39 KG/M2 | TEMPERATURE: 98.1 F | WEIGHT: 217 LBS

## 2022-02-11 PROCEDURE — 74011250637 HC RX REV CODE- 250/637: Performed by: PSYCHIATRY & NEUROLOGY

## 2022-02-11 RX ORDER — OXCARBAZEPINE 300 MG/1
300 TABLET, FILM COATED ORAL 2 TIMES DAILY
Qty: 60 TABLET | Refills: 0 | Status: SHIPPED | OUTPATIENT
Start: 2022-02-11

## 2022-02-11 RX ORDER — BUPROPION HYDROCHLORIDE 150 MG/1
150 TABLET, EXTENDED RELEASE ORAL DAILY
Qty: 30 TABLET | Refills: 0 | Status: SHIPPED | OUTPATIENT
Start: 2022-02-12

## 2022-02-11 RX ADMIN — OXCARBAZEPINE 300 MG: 150 TABLET, FILM COATED ORAL at 09:06

## 2022-02-11 RX ADMIN — BUPROPION HYDROCHLORIDE 150 MG: 150 TABLET, EXTENDED RELEASE ORAL at 09:06

## 2022-02-11 NOTE — PROGRESS NOTES
Progress Note  Date:2/10/2022       Room:[facility]  Patient Name:Doug Batres     YOB: 1985     Age:36 y.o. Subjective    Subjective   Review of Systems  Objective         Vitals Last 24 Hours:  TEMPERATURE:  Temp  Av.9 °F (36.6 °C)  Min: 97.6 °F (36.4 °C)  Max: 98.1 °F (36.7 °C)  RESPIRATIONS RANGE: Resp  Av  Min: 16  Max: 16  PULSE OXIMETRY RANGE: SpO2  Av.7 %  Min: 95 %  Max: 97 %  PULSE RANGE: Pulse  Av.3  Min: 70  Max: 77  BLOOD PRESSURE RANGE: Systolic (41SAY), DUH:235 , Min:112 , VDU:036   ; Diastolic (79ZSM), ZET:90, Min:64, Max:83    I/O (24Hr): Intake/Output Summary (Last 24 hours) at 2/10/2022 2245  Last data filed at 2/10/2022 0347  Gross per 24 hour   Intake 700 ml   Output 600 ml   Net 100 ml     Objective  Labs/Imaging/Diagnostics    Labs:  CBC:  Recent Labs     02/10/22  0648   WBC 4.3   RBC 4.58   HGB 14.3   HCT 43.4   MCV 94.8   RDW 13.2        CHEMISTRIES:  Recent Labs     02/10/22  0648      K 3.9      CO2 27   BUN 13   CA 8.6   PT/INR:No results for input(s): INR, INREXT in the last 72 hours. No lab exists for component: PROTIME  APTT:No results for input(s): APTT in the last 72 hours. LIVER PROFILE:  Recent Labs     02/10/22  0648   AST 16   ALT 26     Lab Results   Component Value Date/Time    ALT (SGPT) 26 02/10/2022 06:48 AM    AST (SGOT) 16 02/10/2022 06:48 AM    Alk. phosphatase 53 02/10/2022 06:48 AM    Bilirubin, total 0.5 02/10/2022 06:48 AM       Imaging Last 24 Hours:  No results found.   Assessment//Plan   Active Problems:    Psychiatric complaint (2022)      Bipolar affective disorder, current episode mixed ([facility] [address] (2022)      Assessment & Plan patient case discussed in the treatment team patient seen patient is doing well polite cooperative not suicidal not homicidal no psychosis no agitation no aggression self-care grooming is good personal hygiene grooming is good slept well eating well and provide the  had a session with him and his wife went well and anticipated discharge on February 11, 2022 he is getting Wellbutrin and Trileptal he felt that it is helping with restlessness short fuse but also used to have some leg pain.   Reassess the case tomorrow vital signs are stable CBC CMP unremarkable and continued inpatient level of care indicated for the medication to work and stabilize but also make sure he is holding onto the progress he may reassess the case tomorrow anticipate discharge tomorrow    Electronically signed by Veronica Edwards MD on 2/10/2022 at 10:45 PM

## 2022-02-11 NOTE — PROGRESS NOTES
Hospitalist Progress Note    Subjective:   Daily Progress Note: 2/11/2022 10:36 AM    Hospital Course: Patient is a 43 y. o. male with history of MDD, presented to the ED for evaluation of suicidal ideation today.  Per patient, he states that he \" just wants to end it\".   Denies specific plan, but admits to previous suicidal attempts in the past.  He complains of anxiety, decreased appetite, difficulty sleeping.  In addition, he also complains of chest congestion and nonproductive cough for the past 3 days, associated with 1 day history of fever and chills. Patient was tested in the emergency room for Covid and tested positive. He is asymptomatic and requires no further treatment. He admitted to the medical floor as behavioral health unit are not excepting Covid positive patients.   Psychiatry consulted    Subjective: Patient offers no complaints    Current Facility-Administered Medications   Medication Dose Route Frequency    OLANZapine (ZyPREXA) tablet 5 mg  5 mg Oral Q6H PRN    haloperidol lactate (HALDOL) injection 5 mg  5 mg IntraMUSCular Q6H PRN    benztropine (COGENTIN) tablet 1 mg  1 mg Oral BID PRN    diphenhydrAMINE (BENADRYL) injection 50 mg  50 mg IntraMUSCular BID PRN    hydrOXYzine HCL (ATARAX) tablet 50 mg  50 mg Oral TID PRN    LORazepam (ATIVAN) injection 1 mg  1 mg IntraMUSCular Q4H PRN    traZODone (DESYREL) tablet 50 mg  50 mg Oral QHS PRN    acetaminophen (TYLENOL) tablet 650 mg  650 mg Oral Q4H PRN    magnesium hydroxide (MILK OF MAGNESIA) 400 mg/5 mL oral suspension 30 mL  30 mL Oral DAILY PRN    buPROPion SR (WELLBUTRIN SR) tablet 150 mg  150 mg Oral DAILY    OXcarbazepine (TRILEPTAL) tablet 300 mg  300 mg Oral BID    sodium chloride (NS) flush 5-40 mL  5-40 mL IntraVENous Q8H    sodium chloride (NS) flush 5-40 mL  5-40 mL IntraVENous PRN    acetaminophen (TYLENOL) tablet 650 mg  650 mg Oral Q6H PRN    Or    acetaminophen (TYLENOL) suppository 650 mg  650 mg Rectal Q6H PRN  polyethylene glycol (MIRALAX) packet 17 g  17 g Oral DAILY PRN    ondansetron (ZOFRAN ODT) tablet 4 mg  4 mg Oral Q8H PRN    Or    ondansetron (ZOFRAN) injection 4 mg  4 mg IntraVENous Q6H PRN    enoxaparin (LOVENOX) injection 30 mg  30 mg SubCUTAneous Q12H        Review of Systems  Constitutional: No fevers, No chills, No sweats, No fatigue, No Weakness  Eyes: No redness  Ears, nose, mouth, throat, and face: No nasal congestion, No sore throat, No voice change  Respiratory: No Shortness of Breath, No cough, No wheezing  Cardiovascular: No chest pain, No palpitations, No extremity edema  Gastrointestinal: No nausea, No vomiting, No diarrhea, No abdominal pain  Genitourinary: No frequency, No dysuria, No hematuria  Integument/breast: No skin lesion(s)   Neurological: No Confusion, No headaches, No dizziness      Objective:     Visit Vitals  BP 97/60 (BP 1 Location: Right upper arm, BP Patient Position: Supine)   Pulse 60   Temp 98.1 °F (36.7 °C)   Resp 18   Ht 6' (1.829 m)   Wt 98.4 kg (217 lb)   SpO2 98%   BMI 29.43 kg/m²      O2 Device: None (Room air)    Temp (24hrs), Av °F (36.7 °C), Min:97.6 °F (36.4 °C), Max:98.3 °F (36.8 °C)      No intake/output data recorded.  1901 -  0700  In: 700 [P.O.:700]  Out: 600 [Urine:600]    PHYSICAL EXAM:  Constitutional: No acute distress  Skin: Extremities and face reveal no rashes. HEENT: Sclerae anicteric. Extra-occular muscles are intact. No oral ulcers. The neck is supple and no masses. Cardiovascular: RRR  Respiratory:  nonlabored  GI: Abdomen nondistended, soft, and nontender. Normal active bowel sounds. Musculoskeletal: No pitting edema of the lower legs. Able to move all ext  Neurological:  Patient is alert and oriented. Cranial nerves II-XII grossly intact  Psychiatric: Mood appears appropriate       Data Review    No results found for this or any previous visit (from the past 24 hour(s)).     CBC:   Lab Results   Component Value Date/Time WBC 4.3 02/10/2022 06:48 AM    RBC 4.58 02/10/2022 06:48 AM    HGB 14.3 02/10/2022 06:48 AM    HCT 43.4 02/10/2022 06:48 AM    PLATELET 038 35/13/8336 06:48 AM     BMP:   Lab Results   Component Value Date/Time    Glucose 91 02/10/2022 06:48 AM    Sodium 141 02/10/2022 06:48 AM    Potassium 3.9 02/10/2022 06:48 AM    Chloride 108 02/10/2022 06:48 AM    CO2 27 02/10/2022 06:48 AM    BUN 13 02/10/2022 06:48 AM    Creatinine 1.15 02/10/2022 06:48 AM    Calcium 8.6 02/10/2022 06:48 AM       Assessment:   1. COVID-19 positive  2. Depression with suicidal ideation  3. Back Pain    Plan:    1. Patient is asymptomatic. Continue to monitor treatments. No need for treatment at this time  2. Psychiatry consulted. Defer management to them. On a one-to-one  3. Ddimer <0.27. Tylenol as needed for pain    Dispo: Patient is medically stable for discharge however pending psychiatry clearance     CODE STATUS full     DVT prophylaxis: Lovenox   Ulcer prophylaxis: Tolerating oral intake    Care Plan discussed with: Patient/Family, Nurse and     Total time spent with patient: 22 minutes.

## 2022-02-11 NOTE — DISCHARGE SUMMARY
Siddhartha Tristan [address] SUMMARY    Name:  Yasmany Hernandez  MR#:  [id number]  :  1985  ACCOUNT #:  [de-identified]  ADMIT DATE:  2022  DISCHARGE DATE:  2022    Please make reference to my psychiatric consultation/H and P. This is a 41-year-old   male patient admitted to medical inpatient as he had MRSA and also COVID positive. He was still under behavioral health program, even though he was admitted to a medical floor. CHIEF COMPLAINT:  Suicidal thought, felt like ending it all. HISTORY OF PRESENT ILLNESS:  He and his wife were arguing and he reportedly felt suicidal without a plan. Denied homicidal ideation but felt like slapping his wife. Denied auditory or visual hallucinations, not on any medications because he cannot find a doctor. He did have a prior treatment, I believe one in the [facility] and one in [place]. Prior attempt to jump off a bridge. He did take Wellbutrin 300 mg daily. He does sometimes drink six pack of beer a day. He was seen by intake staff, admitted to the medical floor. He was seen by [facility], too. The patient readily acknowledged arguing. His wife also has got a mood problem, she does not get help and uses THC and alcohol occasionally. Last done marijuana 3 months ago. Drank half a gallon of alcohol over the past week prior to admission. HOSPITALIZATION COURSE:  The patient was placed on close observation, medical consult, and received Wellbutrin  mg daily, Trileptal 300 mg twice a day and p.r.n. only taking Tylenol. The patient participated in individual therapy, other therapeutic activity, marital counseling, and therapist held marital counseling, it went well, and been on the medication and stable, not suicidal, not homicidal, felt the Trileptal has helped him, and is being discharged. Not agitated. Not aggressive. Compliant with the medication. Personal hygiene and grooming. No psychosis. No side effect of medication. No guns. No weapons.  has arranged for followup. LABORATORIES:  CBC unremarkable. CMP unremarkable. Alcohol level 4. Urinalysis:  Ketones 80, blood small, nitrites negative, rbc 20-50, leukocyte esterase negative, bacteria negative. COVID detected. Influenza A and B not detected. Drug screen negative. DISCHARGE DIAGNOSES:  Bipolar disorder, mixed, recurrent, acute, severe, without psychosis. Episodic alcohol abuse. THC abuse. DISCHARGE MEDICATIONS:  Electronically sent to [facility]. 1.  Trileptal 300 mg twice a day. 2.  Wellbutrin  mg daily. Discharged as improved.       Jenn Og MD      [id number]  D:  02/11/2022 12:05  T:  02/11/2022 14:39  JOB #:  [id number]

## 2022-02-11 NOTE — BH NOTES
DISCHARGE SUMMARY    NAME:Doug Batres  : 1985  MRN: [id number]    The patient Simone Hargrove exhibits the ability to control behavior in a less restrictive environment. Patient's level of functioning is improving. No assaultive/destructive behavior has been observed for the past 24 hours. No suicidal/homicidal threat or behavior has been observed for the past 24 hours. There is no evidence of serious medication side effects. Patient has not been in physical or protective restraints for at least the past 24 hours. If weapons involved, how are they secured? The pt denied having access to firearms. Is patient aware of and in agreement with discharge plan? Yes    Arrangements for medication:  Prescriptions given to patient, given a weeks supply or 30 day supply. Copy of discharge instructions to provider?:  Yes    Arrangements for transportation home:  The pt is being picked up by a medicaid cab ([phone]) with a confirmation number of [id number]. Keep all follow up appointments as scheduled, continue to take prescribed medications per physician instructions. Mental health crisis number:  315 or your local mental health crisis line number at [phone].        Mental Health Emergency WARM LINE      [phone] ([phone])      M-F: 9am to 9pm      Sat & Sun: 5pm  9pm  National suicide prevention lines:                             9-201-HNLDVNC ([phone])       1-300-248-TALK ([phone])    Crisis Text Line:  Text HOME to 153690

## 2022-02-11 NOTE — BH NOTES
Patient is calm, cooperative and pleasant. Pt on 1:1 sitter in room due to SI and is COVID positive but asymptomatic. Pt rates his depression at 4/10, anxiety 5/10, and denies any SI, HI or hallucinations. Pt says the SI is gone now but he is still fighting with his wife and isn't sure he will have a home to go home to. Pt states wife accuses him of using his illness as \"an escape from life\". Pt works as a  but lost job Monday. Wife refuses to work and only wants to go out and spoke marijuana. Pt has 2 children with wife and 2 step kids also in the home. Wife is not a good roll model and tells the children \"Daddy is bad and doesn't want to be here that's why he goes to work and leaves us here\". Wife also \"homeschools 3 of the children\" but patient states she only does work about once a month and th e kids are falling behind and they are going to get into trouble. Wife blames him for not helping to homeschool though he is gone working to pay for everyone in the house as he is the only income. Nurse suggested they get counseling and make sure his children are taken care of if that is the only thing he does with his marriage. Also, wife's exhusband is currently filing to get custody of the 2 step children because the kids aren't attending school and though he is aware, the wife is not even aware of this yet. Pt seemed to like talking about his marital and family issues because it doesn't seem like he has anyone supporting him at home. Pt states the wife cheated with a man sexually that he had caught her and she believes that he deserved it because though he never met a woman in person he talked to a female online. Pt was left with a 1:1 sitter at the bedside, no distress noted, respirations regular and unlabored.

## 2022-02-14 NOTE — BH NOTES
Behavioral Health Transition Record to Provider    Patient Name: Christina Arzate  YOB: 1985  Medical Record Number: [id number]  Date of Admission: 2/7/2022  Date of Discharge: 2/11/22    Attending Provider: No att. providers found  Discharging Provider: Malika Casillas    To contact this individual call [phone] and ask the  to page. If unavailable, ask to be transferred to [facility] Provider on call. Xavier Pérezley Provider will be available on call 24/7 and during holidays. Primary Care Provider: None    Allergies   Allergen Reactions    Remeron [Mirtazapine] Other (comments)       Reason for Admission: The pt was admitted for increased depression and suicidal thoughts. Admission Diagnosis: Psychiatric complaint [F99]  Bipolar affective disorder, current episode mixed, current episode severity unspecified ([facility] [address] [F31.60]    * No surgery found *    Results for orders placed or performed during the hospital encounter of 02/07/22   COVID-19 WITH INFLUENZA A/B   Result Value Ref Range    SARS-CoV-2 DETECTED (A) Not Detected      Influenza A by PCR Not Detected Not Detected      Influenza B by PCR Not Detected Not Detected     CBC WITH AUTOMATED DIFF   Result Value Ref Range    WBC 4.6 4.1 - 11.1 K/uL    RBC 4.62 4.10 - 5.70 M/uL    HGB 14.3 12.1 - 17.0 g/dL    HCT 43.8 36.6 - 50.3 %    MCV 94.8 80.0 - 99.0 FL    MCH 31.0 26.0 - 34.0 PG    MCHC 32.6 30.0 - 36.5 g/dL    RDW 13.7 11.5 - 14.5 %    PLATELET 089 220 - 871 K/uL    MPV 11.4 8.9 - 12.9 FL    NRBC 0.0 0.0  WBC    ABSOLUTE NRBC 0.00 0.00 - 0.01 K/uL    NEUTROPHILS 44 32 - 75 %    LYMPHOCYTES 39 12 - 49 %    MONOCYTES 17 (H) 5 - 13 %    EOSINOPHILS 0 0 - 7 %    BASOPHILS 0 0 - 1 %    IMMATURE GRANULOCYTES 0 %    ABS. NEUTROPHILS 2.0 1.8 - 8.0 K/UL    ABS. LYMPHOCYTES 1.8 0.8 - 3.5 K/UL    ABS. MONOCYTES 0.8 0.0 - 1.0 K/UL    ABS. EOSINOPHILS 0.0 0.0 - 0.4 K/UL    ABS. BASOPHILS 0.0 0.0 - 0.1 K/UL    ABS. IMM. GRANS. 0.0 K/UL    DF Manual      RBC COMMENTS Normocytic, Normochromic     METABOLIC PANEL, COMPREHENSIVE   Result Value Ref Range    Sodium 137 136 - 145 mmol/L    Potassium 3.5 3.5 - 5.1 mmol/L    Chloride 106 97 - 108 mmol/L    CO2 27 21 - 32 mmol/L    Anion gap 4 (L) 5 - 15 mmol/L    Glucose 84 65 - 100 mg/dL    BUN 20 6 - 20 mg/dL    Creatinine 1.29 0.70 - 1.30 mg/dL    BUN/Creatinine ratio 16 12 - 20      GFR est AA >60 >60 ml/min/1.73m2    GFR est non-AA >60 >60 ml/min/1.73m2    Calcium 8.7 8.5 - 10.1 mg/dL    Bilirubin, total 0.8 0.2 - 1.0 mg/dL    AST (SGOT) 26 15 - 37 U/L    ALT (SGPT) 34 12 - 78 U/L    Alk.  phosphatase 67 45 - 117 U/L    Protein, total 7.8 6.4 - 8.2 g/dL    Albumin 3.9 3.5 - 5.0 g/dL    Globulin 3.9 2.0 - 4.0 g/dL    A-G Ratio 1.0 (L) 1.1 - 2.2     ETHYL ALCOHOL   Result Value Ref Range    ALCOHOL(ETHYL),SERUM <4 <10 mg/dL   URINALYSIS W/MICROSCOPIC   Result Value Ref Range    Color Yellow      Appearance Clear Clear      Specific gravity >1.030 (H) 1.003 - 1.030    pH (UA) 5.0 5.0 - 8.0      Protein 30 (A) Negative mg/dL    Glucose Negative Negative mg/dL    Ketone 80 (A) Negative mg/dL    Bilirubin Negative Negative      Blood Small (A) Negative      Urobilinogen 2.0 (H) 0.1 - 1.0 EU/dL    Nitrites Negative Negative      Leukocyte Esterase Negative Negative      WBC 0-4 0 - 4 /hpf    RBC 20-50 0 - 5 /hpf    Bacteria Negative Negative /hpf    Mucus 3+ /lpf   DRUG SCREEN, URINE   Result Value Ref Range    AMPHETAMINES Negative Negative      BARBITURATES Negative Negative      BENZODIAZEPINES Negative Negative      COCAINE Negative Negative      METHADONE Negative Negative      OPIATES Negative Negative      PCP(PHENCYCLIDINE) Negative Negative      THC (TH-CANNABINOL) Negative Negative      Drug screen comment        This test is a screen for drugs of abuse in a medical setting only (i.e., they are unconfirmed results and as such must not be used for non-medical purposes, e.g.,employment testing, legal testing). Due to its inherent nature, false positive (FP) and false negative (FN) results may be obtained. Therefore, if necessary for medical care, recommend confirmation of positive findings by GC/MS. ACETAMINOPHEN   Result Value Ref Range    Acetaminophen level <10 (L) 10 - 30 ug/mL    Reported dose date Dose Dependent      Reported dose: Dose Dependent Units   SALICYLATE   Result Value Ref Range    Salicylate level 1.8 (L) 2.8 - 20.0 mg/dL    Reported dose date Dose Dependent      Reported dose: Dose Dependent Units   D DIMER   Result Value Ref Range    D DIMER <0.27 <0.50 ug/ml(FEU)   METABOLIC PANEL, COMPREHENSIVE   Result Value Ref Range    Sodium 141 136 - 145 mmol/L    Potassium 3.9 3.5 - 5.1 mmol/L    Chloride 108 97 - 108 mmol/L    CO2 27 21 - 32 mmol/L    Anion gap 6 5 - 15 mmol/L    Glucose 91 65 - 100 mg/dL    BUN 13 6 - 20 mg/dL    Creatinine 1.15 0.70 - 1.30 mg/dL    BUN/Creatinine ratio 11 (L) 12 - 20      GFR est AA >60 >60 ml/min/1.73m2    GFR est non-AA >60 >60 ml/min/1.73m2    Calcium 8.6 8.5 - 10.1 mg/dL    Bilirubin, total 0.5 0.2 - 1.0 mg/dL    AST (SGOT) 16 15 - 37 U/L    ALT (SGPT) 26 12 - 78 U/L    Alk.  phosphatase 53 45 - 117 U/L    Protein, total 7.0 6.4 - 8.2 g/dL    Albumin 3.2 (L) 3.5 - 5.0 g/dL    Globulin 3.8 2.0 - 4.0 g/dL    A-G Ratio 0.8 (L) 1.1 - 2.2     CBC W/O DIFF   Result Value Ref Range    WBC 4.3 4.1 - 11.1 K/uL    RBC 4.58 4.10 - 5.70 M/uL    HGB 14.3 12.1 - 17.0 g/dL    HCT 43.4 36.6 - 50.3 %    MCV 94.8 80.0 - 99.0 FL    MCH 31.2 26.0 - 34.0 PG    MCHC 32.9 30.0 - 36.5 g/dL    RDW 13.2 11.5 - 14.5 %    PLATELET 030 164 - 797 K/uL    MPV 12.1 8.9 - 12.9 FL    NRBC 0.0 0.0  WBC    ABSOLUTE NRBC 0.00 0.00 - 0.01 K/uL   TSH 3RD GENERATION   Result Value Ref Range    TSH 1.46 0.36 - 3.74 uIU/mL   LIPID PANEL   Result Value Ref Range    LIPID PROFILE        Cholesterol, total 184 <200 mg/dL    Triglyceride 137 <150 mg/dL    HDL Cholesterol 33 mg/dL LDL, calculated 123.6 (H) 0 - 100 mg/dL    VLDL, calculated 27.4 mg/dL    CHOL/HDL Ratio 5.6 (H) 0.0 - 5.0         Immunizations administered during this encounter: There is no immunization history on file for this patient. Screening for Metabolic Disorders for Patients on Antipsychotic Medications  (Data obtained from the EMR)    Estimated Body Mass Index  Estimated body mass index is 29.43 kg/m² as calculated from the following:    Height as of this encounter: 6' (1.829 m). Weight as of this encounter: 98.4 kg (217 lb). Vital Signs/Blood Pressure  Visit Vitals  BP 97/60 (BP 1 Location: Right upper arm, BP Patient Position: Supine)   Pulse 60   Temp 98.1 °F (36.7 °C)   Resp 18   Ht 6' (1.829 m)   Wt 98.4 kg (217 lb)   SpO2 98%   BMI 29.43 kg/m²       Blood Glucose/Hemoglobin A1c  Lab Results   Component Value Date/Time    Glucose 91 02/10/2022 06:48 AM       No results found for: HBA1C, [id number]     Lipid Panel  Lab Results   Component Value Date/Time    Cholesterol, total 184 02/10/2022 06:48 AM    HDL Cholesterol 33 02/10/2022 06:48 AM    LDL, calculated 123.6 (H) 02/10/2022 06:48 AM    Triglyceride 137 02/10/2022 06:48 AM    CHOL/HDL Ratio 5.6 (H) 02/10/2022 06:48 AM        Discharge Diagnosis: Psychiatric complaint [F99]  Bipolar affective disorder, current episode mixed, current episode severity unspecified ([facility] [F31.60]    Discharge Plan: The pt will be returning home. He was connected to medication management at [facility]. Discharge Medication List and Instructions:   Discharge Medication List as of 2/11/2022 11:10 AM      START taking these medications    Details   buPROPion SR (WELLBUTRIN SR) 150 mg SR tablet Take 1 Tablet by mouth daily. , Normal, Disp-30 Tablet, R-0      OXcarbazepine (TRILEPTAL) 300 mg tablet Take 1 Tablet by mouth two (2) times a day., Print, Disp-60 Tablet, R-0         STOP taking these medications       buPROPion XL (WELLBUTRIN XL) 300 mg XL tablet Comments: Reason for Stopping:               Unresulted Labs (24h ago, onward)            None        To obtain results of studies pending at discharge, please contact [phone]    Follow-up Information     Follow up With Specialties Details Why Contact Info    [facility]    They offer DBT therapy. Patient needs to make appointment. [phone]  [address] [facility] [address]   On 5/24/2022 You have an appointment for medication management at [facility].  [phone]  [facility] [place], [address]    None    None ([phone]) Patient stated that they have no PCP      [facility]    They offer outpatient therapy. [phone]  [address], [address] [place] [address]          Advanced Directive:   Does the patient have an appointed surrogate decision maker? No  Does the patient have a Medical Advance Directive? No  Does the patient have a Psychiatric Advance Directive? No  If the patient does not have a surrogate or Medical Advance Directive AND Psychiatric Advance Directive, the patient was offered information on these advance directives Patient will complete at a later time    Patient Instructions: Please continue all medications until otherwise directed by physician. Tobacco Cessation Discharge Plan:   Is the patient a smoker and needs referral for smoking cessation? No  Patient referred to the following for smoking cessation with an appointment? No     Patient was offered medication to assist with smoking cessation at discharge? No  Was education for smoking cessation added to the discharge instructions? No    Alcohol/Substance Abuse Discharge Plan:   Does the patient have a history of substance/alcohol abuse and requires a referral for treatment? Yes  Patient referred to the following for substance/alcohol abuse treatment with an appointment? Yes  Patient was offered medication to assist with alcohol cessation at discharge?  Not applicable  Was education for substance/alcohol abuse added to discharge instructions?  Yes    Patient discharged to Home; discussed with patient/caregiver

## 2022-02-15 LAB — OXCARBAZEPINE SERPL-MCNC: <1 UG/ML (ref 10–35)
